# Patient Record
Sex: FEMALE | Race: WHITE | NOT HISPANIC OR LATINO | Employment: UNEMPLOYED | URBAN - METROPOLITAN AREA
[De-identification: names, ages, dates, MRNs, and addresses within clinical notes are randomized per-mention and may not be internally consistent; named-entity substitution may affect disease eponyms.]

---

## 2023-04-18 LAB
ALANINE AMINOTRANSFERASE (SGPT) (U/L) IN SER/PLAS: 18 U/L (ref 7–45)
ALBUMIN (G/DL) IN SER/PLAS: 4 G/DL (ref 3.4–5)
ALKALINE PHOSPHATASE (U/L) IN SER/PLAS: 157 U/L (ref 33–136)
ANION GAP IN SER/PLAS: 9 MMOL/L (ref 10–20)
ASPARTATE AMINOTRANSFERASE (SGOT) (U/L) IN SER/PLAS: 26 U/L (ref 9–39)
BILIRUBIN TOTAL (MG/DL) IN SER/PLAS: 0.7 MG/DL (ref 0–1.2)
CALCIUM (MG/DL) IN SER/PLAS: 9.5 MG/DL (ref 8.6–10.3)
CARBON DIOXIDE, TOTAL (MMOL/L) IN SER/PLAS: 32 MMOL/L (ref 21–32)
CHLORIDE (MMOL/L) IN SER/PLAS: 107 MMOL/L (ref 98–107)
CREATININE (MG/DL) IN SER/PLAS: 0.73 MG/DL (ref 0.5–1.05)
ERYTHROCYTE DISTRIBUTION WIDTH (RATIO) BY AUTOMATED COUNT: 20.6 % (ref 11.5–14.5)
ERYTHROCYTE MEAN CORPUSCULAR HEMOGLOBIN CONCENTRATION (G/DL) BY AUTOMATED: 29.8 G/DL (ref 32–36)
ERYTHROCYTE MEAN CORPUSCULAR VOLUME (FL) BY AUTOMATED COUNT: 91 FL (ref 80–100)
ERYTHROCYTES (10*6/UL) IN BLOOD BY AUTOMATED COUNT: 4.46 X10E12/L (ref 4–5.2)
GFR FEMALE: >90 ML/MIN/1.73M2
GLUCOSE (MG/DL) IN SER/PLAS: 82 MG/DL (ref 74–99)
HEMATOCRIT (%) IN BLOOD BY AUTOMATED COUNT: 40.6 % (ref 36–46)
HEMOGLOBIN (G/DL) IN BLOOD: 12.1 G/DL (ref 12–16)
LEUKOCYTES (10*3/UL) IN BLOOD BY AUTOMATED COUNT: 7 X10E9/L (ref 4.4–11.3)
PLATELETS (10*3/UL) IN BLOOD AUTOMATED COUNT: 182 X10E9/L (ref 150–450)
POTASSIUM (MMOL/L) IN SER/PLAS: 5 MMOL/L (ref 3.5–5.3)
PROTEIN TOTAL: 7.2 G/DL (ref 6.4–8.2)
SODIUM (MMOL/L) IN SER/PLAS: 143 MMOL/L (ref 136–145)
UREA NITROGEN (MG/DL) IN SER/PLAS: 24 MG/DL (ref 6–23)

## 2023-10-10 ENCOUNTER — OFFICE VISIT (OUTPATIENT)
Dept: WOUND CARE | Facility: CLINIC | Age: 63
End: 2023-10-10
Payer: COMMERCIAL

## 2023-10-10 PROCEDURE — 11042 DBRDMT SUBQ TIS 1ST 20SQCM/<: CPT | Performed by: CLINICAL NURSE SPECIALIST

## 2023-10-10 PROCEDURE — 11042 DBRDMT SUBQ TIS 1ST 20SQCM/<: CPT

## 2023-10-17 ENCOUNTER — OFFICE VISIT (OUTPATIENT)
Dept: WOUND CARE | Facility: CLINIC | Age: 63
End: 2023-10-17
Payer: COMMERCIAL

## 2023-10-17 PROCEDURE — 11042 DBRDMT SUBQ TIS 1ST 20SQCM/<: CPT | Performed by: CLINICAL NURSE SPECIALIST

## 2023-10-17 PROCEDURE — 11042 DBRDMT SUBQ TIS 1ST 20SQCM/<: CPT

## 2023-10-24 ENCOUNTER — OFFICE VISIT (OUTPATIENT)
Dept: WOUND CARE | Facility: CLINIC | Age: 63
End: 2023-10-24
Payer: COMMERCIAL

## 2023-10-24 PROCEDURE — 11042 DBRDMT SUBQ TIS 1ST 20SQCM/<: CPT

## 2023-10-24 PROCEDURE — 11042 DBRDMT SUBQ TIS 1ST 20SQCM/<: CPT | Performed by: CLINICAL NURSE SPECIALIST

## 2023-10-25 DIAGNOSIS — E78.5 HYPERLIPIDEMIA, UNSPECIFIED HYPERLIPIDEMIA TYPE: Primary | ICD-10-CM

## 2023-10-25 RX ORDER — ATORVASTATIN CALCIUM 40 MG/1
40 TABLET, FILM COATED ORAL DAILY
Qty: 90 TABLET | Refills: 0 | Status: SHIPPED | OUTPATIENT
Start: 2023-10-25 | End: 2023-10-26 | Stop reason: SDUPTHER

## 2023-10-26 ENCOUNTER — PATIENT MESSAGE (OUTPATIENT)
Dept: PRIMARY CARE | Facility: CLINIC | Age: 63
End: 2023-10-26
Payer: COMMERCIAL

## 2023-10-26 DIAGNOSIS — E78.5 HYPERLIPIDEMIA, UNSPECIFIED HYPERLIPIDEMIA TYPE: ICD-10-CM

## 2023-10-26 RX ORDER — ATORVASTATIN CALCIUM 40 MG/1
40 TABLET, FILM COATED ORAL DAILY
Qty: 90 TABLET | Refills: 0 | Status: SHIPPED | OUTPATIENT
Start: 2023-10-26 | End: 2024-01-04 | Stop reason: SDUPTHER

## 2023-11-07 ENCOUNTER — OFFICE VISIT (OUTPATIENT)
Dept: WOUND CARE | Facility: CLINIC | Age: 63
End: 2023-11-07
Payer: COMMERCIAL

## 2023-11-07 PROCEDURE — 11042 DBRDMT SUBQ TIS 1ST 20SQCM/<: CPT | Performed by: CLINICAL NURSE SPECIALIST

## 2023-11-07 PROCEDURE — 11042 DBRDMT SUBQ TIS 1ST 20SQCM/<: CPT

## 2023-11-14 ENCOUNTER — OFFICE VISIT (OUTPATIENT)
Dept: WOUND CARE | Facility: CLINIC | Age: 63
End: 2023-11-14
Payer: COMMERCIAL

## 2023-11-14 PROCEDURE — 11042 DBRDMT SUBQ TIS 1ST 20SQCM/<: CPT

## 2023-11-14 PROCEDURE — 11042 DBRDMT SUBQ TIS 1ST 20SQCM/<: CPT | Performed by: CLINICAL NURSE SPECIALIST

## 2023-11-21 ENCOUNTER — OFFICE VISIT (OUTPATIENT)
Dept: WOUND CARE | Facility: CLINIC | Age: 63
End: 2023-11-21
Payer: COMMERCIAL

## 2023-11-21 PROCEDURE — 11042 DBRDMT SUBQ TIS 1ST 20SQCM/<: CPT | Performed by: CLINICAL NURSE SPECIALIST

## 2023-11-21 PROCEDURE — 11042 DBRDMT SUBQ TIS 1ST 20SQCM/<: CPT

## 2023-11-28 ENCOUNTER — OFFICE VISIT (OUTPATIENT)
Dept: WOUND CARE | Facility: CLINIC | Age: 63
End: 2023-11-28
Payer: COMMERCIAL

## 2023-11-28 DIAGNOSIS — G35 MULTIPLE SCLEROSIS (MULTI): Primary | ICD-10-CM

## 2023-11-28 PROCEDURE — 11042 DBRDMT SUBQ TIS 1ST 20SQCM/<: CPT

## 2023-11-28 PROCEDURE — 11042 DBRDMT SUBQ TIS 1ST 20SQCM/<: CPT | Performed by: CLINICAL NURSE SPECIALIST

## 2023-11-28 RX ORDER — METHOTREXATE 2.5 MG/1
15 TABLET ORAL
Qty: 24 TABLET | Refills: 5 | Status: SHIPPED | OUTPATIENT
Start: 2023-11-28 | End: 2024-04-18 | Stop reason: SDUPTHER

## 2023-11-28 RX ORDER — BACLOFEN 10 MG/1
10 TABLET ORAL 4 TIMES DAILY
COMMUNITY
Start: 2012-12-19 | End: 2024-01-04 | Stop reason: SDUPTHER

## 2023-11-28 RX ORDER — ASPIRIN 81 MG/1
1 TABLET ORAL DAILY
COMMUNITY
Start: 2023-01-31

## 2023-12-05 ENCOUNTER — OFFICE VISIT (OUTPATIENT)
Dept: WOUND CARE | Facility: CLINIC | Age: 63
End: 2023-12-05
Payer: COMMERCIAL

## 2023-12-05 PROCEDURE — 11042 DBRDMT SUBQ TIS 1ST 20SQCM/<: CPT | Mod: ZK

## 2023-12-05 PROCEDURE — 11042 DBRDMT SUBQ TIS 1ST 20SQCM/<: CPT | Performed by: CLINICAL NURSE SPECIALIST

## 2023-12-12 ENCOUNTER — OFFICE VISIT (OUTPATIENT)
Dept: WOUND CARE | Facility: CLINIC | Age: 63
End: 2023-12-12
Payer: COMMERCIAL

## 2023-12-12 PROCEDURE — 11042 DBRDMT SUBQ TIS 1ST 20SQCM/<: CPT | Mod: ZK

## 2023-12-12 PROCEDURE — 11042 DBRDMT SUBQ TIS 1ST 20SQCM/<: CPT | Performed by: CLINICAL NURSE SPECIALIST

## 2023-12-19 ENCOUNTER — OFFICE VISIT (OUTPATIENT)
Dept: WOUND CARE | Facility: CLINIC | Age: 63
End: 2023-12-19
Payer: COMMERCIAL

## 2023-12-19 PROCEDURE — 11042 DBRDMT SUBQ TIS 1ST 20SQCM/<: CPT | Performed by: CLINICAL NURSE SPECIALIST

## 2023-12-19 PROCEDURE — 11042 DBRDMT SUBQ TIS 1ST 20SQCM/<: CPT

## 2023-12-26 ENCOUNTER — OFFICE VISIT (OUTPATIENT)
Dept: WOUND CARE | Facility: CLINIC | Age: 63
End: 2023-12-26
Payer: COMMERCIAL

## 2023-12-26 PROCEDURE — 11042 DBRDMT SUBQ TIS 1ST 20SQCM/<: CPT | Mod: ZK

## 2023-12-26 PROCEDURE — 11042 DBRDMT SUBQ TIS 1ST 20SQCM/<: CPT | Performed by: CLINICAL NURSE SPECIALIST

## 2024-01-02 ENCOUNTER — OFFICE VISIT (OUTPATIENT)
Dept: WOUND CARE | Facility: CLINIC | Age: 64
End: 2024-01-02
Payer: COMMERCIAL

## 2024-01-02 PROCEDURE — 11042 DBRDMT SUBQ TIS 1ST 20SQCM/<: CPT

## 2024-01-02 PROCEDURE — 11042 DBRDMT SUBQ TIS 1ST 20SQCM/<: CPT | Performed by: CLINICAL NURSE SPECIALIST

## 2024-01-04 ENCOUNTER — PATIENT MESSAGE (OUTPATIENT)
Dept: PRIMARY CARE | Facility: CLINIC | Age: 64
End: 2024-01-04
Payer: COMMERCIAL

## 2024-01-04 DIAGNOSIS — E78.5 HYPERLIPIDEMIA, UNSPECIFIED HYPERLIPIDEMIA TYPE: ICD-10-CM

## 2024-01-04 DIAGNOSIS — N31.9 NEUROGENIC BLADDER: Primary | ICD-10-CM

## 2024-01-04 DIAGNOSIS — G35 MULTIPLE SCLEROSIS (MULTI): Primary | ICD-10-CM

## 2024-01-04 PROBLEM — S31.000A SACRAL WOUND: Status: ACTIVE | Noted: 2020-10-20

## 2024-01-04 PROBLEM — R33.9 INCOMPLETE BLADDER EMPTYING: Status: ACTIVE | Noted: 2024-01-04

## 2024-01-04 PROBLEM — E55.9 VITAMIN D DEFICIENCY: Status: ACTIVE | Noted: 2019-03-29

## 2024-01-04 PROBLEM — D64.9 NORMOCYTIC ANEMIA: Status: ACTIVE | Noted: 2020-10-20

## 2024-01-04 PROBLEM — L89.152 PRESSURE INJURY OF SACRAL REGION, STAGE 2 (MULTI): Status: ACTIVE | Noted: 2020-06-08

## 2024-01-04 PROBLEM — H61.20 IMPACTED CERUMEN: Status: ACTIVE | Noted: 2019-03-29

## 2024-01-04 PROBLEM — N20.0 KIDNEY STONES: Status: ACTIVE | Noted: 2023-06-22

## 2024-01-04 PROBLEM — E43 SEVERE PROTEIN-CALORIE MALNUTRITION (MULTI): Status: ACTIVE | Noted: 2020-10-27

## 2024-01-04 PROBLEM — R35.0 INCREASED FREQUENCY OF URINATION: Status: ACTIVE | Noted: 2019-03-29

## 2024-01-04 PROBLEM — L89.310 PRESSURE INJURY OF RIGHT BUTTOCK, UNSTAGEABLE (MULTI): Status: ACTIVE | Noted: 2023-02-01

## 2024-01-04 PROBLEM — N39.0 UTI (URINARY TRACT INFECTION): Status: ACTIVE | Noted: 2024-01-04

## 2024-01-04 PROBLEM — E03.9 ACQUIRED HYPOTHYROIDISM: Status: ACTIVE | Noted: 2019-03-29

## 2024-01-04 PROBLEM — S31.819A WOUND OF RIGHT BUTTOCK: Status: ACTIVE | Noted: 2024-01-04

## 2024-01-04 PROBLEM — B96.5 PSEUDOMONAS (AERUGINOSA) (MALLEI) (PSEUDOMALLEI) AS THE CAUSE OF DISEASES CLASSIFIED ELSEWHERE: Status: ACTIVE | Noted: 2023-02-22

## 2024-01-04 PROBLEM — M25.572 ACUTE LEFT ANKLE PAIN: Status: ACTIVE | Noted: 2024-01-04

## 2024-01-04 PROBLEM — N21.0 BLADDER STONES: Status: ACTIVE | Noted: 2024-01-04

## 2024-01-04 PROBLEM — S31.809A OPEN WOUND OF BUTTOCK: Status: ACTIVE | Noted: 2022-12-28

## 2024-01-04 PROBLEM — Z20.822 CONTACT WITH AND (SUSPECTED) EXPOSURE TO COVID-19: Status: ACTIVE | Noted: 2022-11-12

## 2024-01-04 PROBLEM — Z88.0 ALLERGY STATUS TO PENICILLIN: Status: ACTIVE | Noted: 2022-11-12

## 2024-01-04 PROBLEM — D12.6 TUBULAR ADENOMA OF COLON: Status: ACTIVE | Noted: 2024-01-04

## 2024-01-04 PROBLEM — G45.9 TIA (TRANSIENT ISCHEMIC ATTACK): Status: ACTIVE | Noted: 2024-01-04

## 2024-01-04 PROBLEM — R33.9 RETENTION OF URINE, UNSPECIFIED: Status: ACTIVE | Noted: 2018-07-24

## 2024-01-04 PROBLEM — G45.9 TRANSIENT ISCHEMIC ATTACK: Status: ACTIVE | Noted: 2022-11-11

## 2024-01-04 PROBLEM — R33.8 ACUTE RETENTION OF URINE: Status: ACTIVE | Noted: 2022-11-12

## 2024-01-04 RX ORDER — BACLOFEN 10 MG/1
10 TABLET ORAL 4 TIMES DAILY
Qty: 360 TABLET | Refills: 0 | Status: SHIPPED | OUTPATIENT
Start: 2024-01-04 | End: 2024-03-18 | Stop reason: ALTCHOICE

## 2024-01-04 RX ORDER — ATORVASTATIN CALCIUM 40 MG/1
40 TABLET, FILM COATED ORAL DAILY
Qty: 90 TABLET | Refills: 0 | Status: SHIPPED | OUTPATIENT
Start: 2024-01-04 | End: 2024-03-18 | Stop reason: SDUPTHER

## 2024-01-04 RX ORDER — BACLOFEN 10 MG/1
10 TABLET ORAL 4 TIMES DAILY
Qty: 360 TABLET | Refills: 3 | Status: SHIPPED | OUTPATIENT
Start: 2024-01-04 | End: 2024-03-18 | Stop reason: ALTCHOICE

## 2024-01-04 NOTE — TELEPHONE ENCOUNTER
From: Kristi Pat  To: Rhonda Qureshi MD  Sent: 1/4/2024 10:28 AM EST  Subject: Baclofen and Atorvastatin    Dear Dr. Minor,    I need a renewal on both medicaitons - I have an appointment scheduled with you on 3/18/24 at 10:00 AM for my annual physical.    Baclofen: I will run out in early February there are no reills available.  Atorvastatin: I will run out in late February again with no refills available at this time.    If they could be renewed via [facility] that would be great (on the Baclofen they will reach out to you since I tried to refill today). I have not attempted to refill the Atorvastatin yet - not till Feb.    Thank you, Kristi

## 2024-01-09 ENCOUNTER — OFFICE VISIT (OUTPATIENT)
Dept: WOUND CARE | Facility: CLINIC | Age: 64
End: 2024-01-09
Payer: COMMERCIAL

## 2024-01-09 PROCEDURE — 11042 DBRDMT SUBQ TIS 1ST 20SQCM/<: CPT | Performed by: CLINICAL NURSE SPECIALIST

## 2024-01-09 PROCEDURE — 11042 DBRDMT SUBQ TIS 1ST 20SQCM/<: CPT | Mod: ZK

## 2024-01-16 ENCOUNTER — OFFICE VISIT (OUTPATIENT)
Dept: WOUND CARE | Facility: CLINIC | Age: 64
End: 2024-01-16
Payer: COMMERCIAL

## 2024-01-16 PROCEDURE — 11042 DBRDMT SUBQ TIS 1ST 20SQCM/<: CPT

## 2024-01-16 PROCEDURE — 11042 DBRDMT SUBQ TIS 1ST 20SQCM/<: CPT | Performed by: CLINICAL NURSE SPECIALIST

## 2024-01-23 ENCOUNTER — OFFICE VISIT (OUTPATIENT)
Dept: WOUND CARE | Facility: CLINIC | Age: 64
End: 2024-01-23
Payer: COMMERCIAL

## 2024-01-23 PROCEDURE — 11042 DBRDMT SUBQ TIS 1ST 20SQCM/<: CPT | Performed by: CLINICAL NURSE SPECIALIST

## 2024-01-23 PROCEDURE — 11042 DBRDMT SUBQ TIS 1ST 20SQCM/<: CPT | Mod: ZK

## 2024-01-30 ENCOUNTER — APPOINTMENT (OUTPATIENT)
Dept: WOUND CARE | Facility: CLINIC | Age: 64
End: 2024-01-30
Payer: COMMERCIAL

## 2024-02-06 ENCOUNTER — OFFICE VISIT (OUTPATIENT)
Dept: WOUND CARE | Facility: CLINIC | Age: 64
End: 2024-02-06
Payer: COMMERCIAL

## 2024-02-06 PROCEDURE — 11042 DBRDMT SUBQ TIS 1ST 20SQCM/<: CPT | Performed by: CLINICAL NURSE SPECIALIST

## 2024-02-06 PROCEDURE — 11042 DBRDMT SUBQ TIS 1ST 20SQCM/<: CPT | Mod: ZK

## 2024-02-20 ENCOUNTER — OFFICE VISIT (OUTPATIENT)
Dept: WOUND CARE | Facility: CLINIC | Age: 64
End: 2024-02-20
Payer: COMMERCIAL

## 2024-02-20 PROCEDURE — 11042 DBRDMT SUBQ TIS 1ST 20SQCM/<: CPT | Performed by: CLINICAL NURSE SPECIALIST

## 2024-02-20 PROCEDURE — 11042 DBRDMT SUBQ TIS 1ST 20SQCM/<: CPT | Mod: ZK

## 2024-02-27 ENCOUNTER — OFFICE VISIT (OUTPATIENT)
Dept: WOUND CARE | Facility: CLINIC | Age: 64
End: 2024-02-27
Payer: COMMERCIAL

## 2024-02-27 PROCEDURE — 11042 DBRDMT SUBQ TIS 1ST 20SQCM/<: CPT | Mod: ZK

## 2024-02-27 PROCEDURE — 11042 DBRDMT SUBQ TIS 1ST 20SQCM/<: CPT | Performed by: CLINICAL NURSE SPECIALIST

## 2024-03-05 ENCOUNTER — APPOINTMENT (OUTPATIENT)
Dept: WOUND CARE | Facility: CLINIC | Age: 64
End: 2024-03-05
Payer: COMMERCIAL

## 2024-03-12 ENCOUNTER — OFFICE VISIT (OUTPATIENT)
Dept: WOUND CARE | Facility: CLINIC | Age: 64
End: 2024-03-12
Payer: COMMERCIAL

## 2024-03-12 PROCEDURE — 99213 OFFICE O/P EST LOW 20 MIN: CPT

## 2024-03-12 PROCEDURE — 99213 OFFICE O/P EST LOW 20 MIN: CPT | Performed by: CLINICAL NURSE SPECIALIST

## 2024-03-14 ASSESSMENT — PROMIS GLOBAL HEALTH SCALE
RATE_AVERAGE_FATIGUE: MILD
CARRYOUT_PHYSICAL_ACTIVITIES: NOT AT ALL
RATE_GENERAL_HEALTH: VERY GOOD
CARRYOUT_SOCIAL_ACTIVITIES: GOOD
RATE_MENTAL_HEALTH: VERY GOOD
EMOTIONAL_PROBLEMS: RARELY
RATE_QUALITY_OF_LIFE: GOOD
RATE_PHYSICAL_HEALTH: VERY GOOD
RATE_AVERAGE_PAIN: 1
RATE_SOCIAL_SATISFACTION: VERY GOOD

## 2024-03-18 ENCOUNTER — OFFICE VISIT (OUTPATIENT)
Dept: PRIMARY CARE | Facility: CLINIC | Age: 64
End: 2024-03-18
Payer: COMMERCIAL

## 2024-03-18 VITALS
TEMPERATURE: 96.9 F | DIASTOLIC BLOOD PRESSURE: 54 MMHG | SYSTOLIC BLOOD PRESSURE: 102 MMHG | OXYGEN SATURATION: 98 % | HEART RATE: 94 BPM

## 2024-03-18 DIAGNOSIS — H61.23 BILATERAL IMPACTED CERUMEN: ICD-10-CM

## 2024-03-18 DIAGNOSIS — N31.9 NEUROGENIC BLADDER: ICD-10-CM

## 2024-03-18 DIAGNOSIS — Z00.00 ENCOUNTER FOR HEALTH MAINTENANCE EXAMINATION IN ADULT: ICD-10-CM

## 2024-03-18 DIAGNOSIS — G35 MULTIPLE SCLEROSIS (MULTI): ICD-10-CM

## 2024-03-18 DIAGNOSIS — Z00.00 PHYSICAL EXAM: Primary | ICD-10-CM

## 2024-03-18 DIAGNOSIS — E78.5 HYPERLIPIDEMIA, UNSPECIFIED HYPERLIPIDEMIA TYPE: ICD-10-CM

## 2024-03-18 DIAGNOSIS — Z12.31 ENCOUNTER FOR SCREENING MAMMOGRAM FOR MALIGNANT NEOPLASM OF BREAST: ICD-10-CM

## 2024-03-18 PROBLEM — Z99.3 DEPENDENCE ON WHEELCHAIR: Status: ACTIVE | Noted: 2023-09-05

## 2024-03-18 PROCEDURE — 99396 PREV VISIT EST AGE 40-64: CPT | Performed by: INTERNAL MEDICINE

## 2024-03-18 PROCEDURE — 69209 REMOVE IMPACTED EAR WAX UNI: CPT | Performed by: INTERNAL MEDICINE

## 2024-03-18 RX ORDER — BACLOFEN 10 MG/1
10 TABLET ORAL 4 TIMES DAILY
Qty: 360 TABLET | Refills: 3 | Status: SHIPPED | OUTPATIENT
Start: 2024-03-18 | End: 2025-03-18

## 2024-03-18 RX ORDER — ATORVASTATIN CALCIUM 40 MG/1
40 TABLET, FILM COATED ORAL DAILY
Qty: 90 TABLET | Refills: 3 | Status: SHIPPED | OUTPATIENT
Start: 2024-03-18 | End: 2025-03-13

## 2024-03-18 ASSESSMENT — PATIENT HEALTH QUESTIONNAIRE - PHQ9
2. FEELING DOWN, DEPRESSED OR HOPELESS: NOT AT ALL
SUM OF ALL RESPONSES TO PHQ QUESTIONS 1-9: 3
10. IF YOU CHECKED OFF ANY PROBLEMS, HOW DIFFICULT HAVE THESE PROBLEMS MADE IT FOR YOU TO DO YOUR WORK, TAKE CARE OF THINGS AT HOME, OR GET ALONG WITH OTHER PEOPLE: SOMEWHAT DIFFICULT
9. THOUGHTS THAT YOU WOULD BE BETTER OFF DEAD, OR OF HURTING YOURSELF: NOT AT ALL
1. LITTLE INTEREST OR PLEASURE IN DOING THINGS: NOT AT ALL
5. POOR APPETITE OR OVEREATING: NOT AT ALL
4. FEELING TIRED OR HAVING LITTLE ENERGY: SEVERAL DAYS
6. FEELING BAD ABOUT YOURSELF - OR THAT YOU ARE A FAILURE OR HAVE LET YOURSELF OR YOUR FAMILY DOWN: NOT AT ALL
SUM OF ALL RESPONSES TO PHQ9 QUESTIONS 1 AND 2: 0
7. TROUBLE CONCENTRATING ON THINGS, SUCH AS READING THE NEWSPAPER OR WATCHING TELEVISION: NOT AT ALL
3. TROUBLE FALLING OR STAYING ASLEEP OR SLEEPING TOO MUCH: MORE THAN HALF THE DAYS
8. MOVING OR SPEAKING SO SLOWLY THAT OTHER PEOPLE COULD HAVE NOTICED. OR THE OPPOSITE, BEING SO FIGETY OR RESTLESS THAT YOU HAVE BEEN MOVING AROUND A LOT MORE THAN USUAL: NOT AT ALL

## 2024-03-18 ASSESSMENT — ANXIETY QUESTIONNAIRES
6. BECOMING EASILY ANNOYED OR IRRITABLE: NOT AT ALL
1. FEELING NERVOUS, ANXIOUS, OR ON EDGE: NOT AT ALL
GAD7 TOTAL SCORE: 3
3. WORRYING TOO MUCH ABOUT DIFFERENT THINGS: SEVERAL DAYS
2. NOT BEING ABLE TO STOP OR CONTROL WORRYING: SEVERAL DAYS
IF YOU CHECKED OFF ANY PROBLEMS ON THIS QUESTIONNAIRE, HOW DIFFICULT HAVE THESE PROBLEMS MADE IT FOR YOU TO DO YOUR WORK, TAKE CARE OF THINGS AT HOME, OR GET ALONG WITH OTHER PEOPLE: SOMEWHAT DIFFICULT
4. TROUBLE RELAXING: SEVERAL DAYS
7. FEELING AFRAID AS IF SOMETHING AWFUL MIGHT HAPPEN: NOT AT ALL
5. BEING SO RESTLESS THAT IT IS HARD TO SIT STILL: NOT AT ALL

## 2024-03-18 NOTE — PROGRESS NOTES
"SUBJECTIVE:   Kristi Pat is a 63 y.o. female presenting for her annual physical/wellness.  PCP: Rhonda Qureshi MD  Physical.  Doing well, things are stable  Feels hearing muffled, and no pain.  MS is stable, follows with neurologist   Wound on right gluteus is healing well, one more visit with wound care.    Colon screening: Colonoscopy 2017, very small polyp found  Last pap: na  Last mammogram: 5/2023  Dexa na  Vaccines Up to date, will get RSV also.  Diet:   healthy in general     No results found for: \"HGBA1C\"  Lab Results   Component Value Date    CREATININE 0.73 04/18/2023     Lab Results   Component Value Date    WBC 7.0 04/18/2023    HGB 12.1 04/18/2023    HCT 40.6 04/18/2023    MCV 91 04/18/2023     04/18/2023     Lab Results   Component Value Date    CHOL 215 (H) 04/23/2018     Lab Results   Component Value Date    HDL 54.8 04/23/2018     No results found for: \"LDLCALC\"  No results found for: \"TRIG\"  No components found for: \"CHOLHDL\"       ROS:   Feeling well. No dyspnea or chest pain on exertion. No abdominal pain, change in bowel habits, black or bloody stools. No urinary tract or  symptoms.  No breast concerns. No neurological complaints.    OBJECTIVE:   The patient appears well, alert, oriented x 3, in no distress.   /54   Pulse 94   Temp 36.1 °C (96.9 °F)   SpO2 98%   ENT normal except for cerumen in both ears. Neck supple. No adenopathy or thyromegaly. GENARO. Lungs are clear, good air entry, no wheezes, rhonchi or rales. S1 and S2 normal, no murmurs, regular rate and rhythm.        1. Physical exam        2. Hyperlipidemia, unspecified hyperlipidemia type  atorvastatin (Lipitor) 40 mg tablet      3. Multiple sclerosis (CMS/HCC)        4. Neurogenic bladder  baclofen (Lioresal) 10 mg tablet      5. Encounter for screening mammogram for malignant neoplasm of breast  BI mammo bilateral screening tomosynthesis      6. Encounter for health maintenance examination in adult  CBC, " Hemoglobin A1C, Lipid Panel, Comprehensive Metabolic Panel        Patient ID: Kristi Pat is a 63 y.o. female.    Ear Cerumen Removal    Date/Time: 3/18/2024 11:29 AM    Performed by: Rhonda Qureshi MD  Authorized by: Rhonda Qureshi MD    Consent:     Consent obtained:  Verbal    Consent given by:  Patient    Risks, benefits, and alternatives were discussed: yes      Risks discussed:  Pain    Alternatives discussed:  No treatment  Universal protocol:     Procedure explained and questions answered to patient or proxy's satisfaction: yes      Patient identity confirmed:  Verbally with patient  Procedure details:     Location:  L ear and R ear    Procedure type: irrigation      Procedure outcomes: cerumen removed    Post-procedure details:     Inspection:  Ear canal clear, no bleeding and TM intact    Hearing quality:  Improved    Procedure completion:  Tolerated

## 2024-03-26 ENCOUNTER — LAB (OUTPATIENT)
Dept: LAB | Facility: LAB | Age: 64
End: 2024-03-26
Payer: COMMERCIAL

## 2024-03-26 ENCOUNTER — OFFICE VISIT (OUTPATIENT)
Dept: WOUND CARE | Facility: CLINIC | Age: 64
End: 2024-03-26
Payer: COMMERCIAL

## 2024-03-26 DIAGNOSIS — Z00.00 ENCOUNTER FOR HEALTH MAINTENANCE EXAMINATION IN ADULT: ICD-10-CM

## 2024-03-26 LAB
ALBUMIN SERPL BCP-MCNC: 4.1 G/DL (ref 3.4–5)
ALP SERPL-CCNC: 92 U/L (ref 33–136)
ALT SERPL W P-5'-P-CCNC: 43 U/L (ref 7–45)
ANION GAP SERPL CALC-SCNC: 10 MMOL/L (ref 10–20)
AST SERPL W P-5'-P-CCNC: 40 U/L (ref 9–39)
BILIRUB SERPL-MCNC: 0.9 MG/DL (ref 0–1.2)
BUN SERPL-MCNC: 22 MG/DL (ref 6–23)
CALCIUM SERPL-MCNC: 9.6 MG/DL (ref 8.6–10.3)
CHLORIDE SERPL-SCNC: 107 MMOL/L (ref 98–107)
CHOLEST SERPL-MCNC: 111 MG/DL (ref 0–199)
CHOLESTEROL/HDL RATIO: 2.5
CO2 SERPL-SCNC: 32 MMOL/L (ref 21–32)
CREAT SERPL-MCNC: 0.82 MG/DL (ref 0.5–1.05)
EGFRCR SERPLBLD CKD-EPI 2021: 81 ML/MIN/1.73M*2
ERYTHROCYTE [DISTWIDTH] IN BLOOD BY AUTOMATED COUNT: 14 % (ref 11.5–14.5)
EST. AVERAGE GLUCOSE BLD GHB EST-MCNC: 111 MG/DL
GLUCOSE SERPL-MCNC: 87 MG/DL (ref 74–99)
HBA1C MFR BLD: 5.5 %
HCT VFR BLD AUTO: 44.6 % (ref 36–46)
HDLC SERPL-MCNC: 43.8 MG/DL
HGB BLD-MCNC: 13.9 G/DL (ref 12–16)
LDLC SERPL CALC-MCNC: 51 MG/DL
MCH RBC QN AUTO: 30.7 PG (ref 26–34)
MCHC RBC AUTO-ENTMCNC: 31.2 G/DL (ref 32–36)
MCV RBC AUTO: 99 FL (ref 80–100)
NON HDL CHOLESTEROL: 67 MG/DL (ref 0–149)
NRBC BLD-RTO: 0 /100 WBCS (ref 0–0)
PLATELET # BLD AUTO: 143 X10*3/UL (ref 150–450)
POTASSIUM SERPL-SCNC: 5.6 MMOL/L (ref 3.5–5.3)
PROT SERPL-MCNC: 7 G/DL (ref 6.4–8.2)
RBC # BLD AUTO: 4.53 X10*6/UL (ref 4–5.2)
SODIUM SERPL-SCNC: 143 MMOL/L (ref 136–145)
TRIGL SERPL-MCNC: 83 MG/DL (ref 0–149)
VLDL: 17 MG/DL (ref 0–40)
WBC # BLD AUTO: 5.7 X10*3/UL (ref 4.4–11.3)

## 2024-03-26 PROCEDURE — 83036 HEMOGLOBIN GLYCOSYLATED A1C: CPT

## 2024-03-26 PROCEDURE — 99212 OFFICE O/P EST SF 10 MIN: CPT

## 2024-03-26 PROCEDURE — 36415 COLL VENOUS BLD VENIPUNCTURE: CPT

## 2024-03-26 PROCEDURE — 80061 LIPID PANEL: CPT

## 2024-03-26 PROCEDURE — 80053 COMPREHEN METABOLIC PANEL: CPT

## 2024-03-26 PROCEDURE — 99212 OFFICE O/P EST SF 10 MIN: CPT | Performed by: CLINICAL NURSE SPECIALIST

## 2024-03-26 PROCEDURE — 85027 COMPLETE CBC AUTOMATED: CPT

## 2024-03-28 DIAGNOSIS — N20.0 KIDNEY STONES: ICD-10-CM

## 2024-03-29 DIAGNOSIS — E87.5 HYPERKALEMIA: Primary | ICD-10-CM

## 2024-03-29 DIAGNOSIS — D69.6 THROMBOCYTOPENIA (CMS-HCC): ICD-10-CM

## 2024-04-09 ENCOUNTER — APPOINTMENT (OUTPATIENT)
Dept: WOUND CARE | Facility: CLINIC | Age: 64
End: 2024-04-09
Payer: COMMERCIAL

## 2024-04-18 ENCOUNTER — OFFICE VISIT (OUTPATIENT)
Dept: NEUROLOGY | Facility: CLINIC | Age: 64
End: 2024-04-18
Payer: COMMERCIAL

## 2024-04-18 VITALS
BODY MASS INDEX: 19.01 KG/M2 | RESPIRATION RATE: 18 BRPM | DIASTOLIC BLOOD PRESSURE: 72 MMHG | SYSTOLIC BLOOD PRESSURE: 102 MMHG | WEIGHT: 125 LBS | HEART RATE: 132 BPM

## 2024-04-18 DIAGNOSIS — G35 MULTIPLE SCLEROSIS (MULTI): Primary | ICD-10-CM

## 2024-04-18 PROCEDURE — 99214 OFFICE O/P EST MOD 30 MIN: CPT | Performed by: PSYCHIATRY & NEUROLOGY

## 2024-04-18 RX ORDER — METHOTREXATE 2.5 MG/1
15 TABLET ORAL
Qty: 72 TABLET | Refills: 1 | Status: SHIPPED | OUTPATIENT
Start: 2024-04-21

## 2024-04-18 ASSESSMENT — PAIN SCALES - GENERAL: PAINLEVEL: 0-NO PAIN

## 2024-04-18 NOTE — PROGRESS NOTES
"Chief Complaint  f/u visit for MS      History Summary, last seen 3/23  Ms. Guerrero is a 62yo F with hx of MS (dx 1985, used to be on Copaxone, then on MTX). She last seen Dr. Petty back in 2014 before he retired then switched care to Dr. Jaime at [facility] for continuity of care.  She presented to our MS clinic to re-establish care at .  Pt clinically stable for the past year but getting progressively weak in her BLEs throughout the years along with bladder incontinence. She used to mobilize using a rollator-walker. However, after she fell and had an ulcer and wound at the buttock area she switched to an electronic scooter in an attempt to avoid falling and worsening the wound.  In Nov '22 had an episode of fragmented speech, could not make sense for 25 minutes, no other symptoms, could understand fine, diagnosed with TIA at , started statin and ASA.     MRI brain 05/23 vs  11/22 and 03/21  1. Redemonstrated is a moderate degree of patchy and confluent signal   within the bilateral cerebral hemispheres and antonio compatible with   chronic small vessel ischemic change and or demyelinating disease.   The bilateral brachium pontis and right cerebellar signal is less   conspicuous on the current exam. No definite new abnormal signal. No   diffusion restricting or enhancing lesions on the current exam.   2. No acute intracranial infarct or mass effect.   3. Parenchymal volume loss is similar to the prior exam.   MRI brain 11/21 vs 3/21: apparent new changes in both middle cerebellar peduncles on FLAIR, less obvious on T2     Interval Hx:  Wound on the hip has \"finally healed\". No infections. Was in bed and had a cold, chills yesterday but feels better today. They have a sit to stand lift, she is able to push up with her legs. At night in bed feels like she has socks on but she does not, no other associated symptoms. She feels stable but admits her legs might be weaker.    Patient Active Problem List   Diagnosis    " Wound of right buttock    Open wound of buttock    Vitamin D deficiency    UTI (urinary tract infection)    Tubular adenoma of colon    Transient ischemic attack    TIA (transient ischemic attack)    Severe protein-calorie malnutrition (Multi)    Sacral wound    Pressure injury of sacral region, stage 2 (Multi)    Normocytic anemia    Neurogenic bladder    Multiple sclerosis (Multi)    Kidney stones    Increased frequency of urination    Incomplete bladder emptying    Impacted cerumen    Hyperlipidemia    Hyperlipemia    Contact with and (suspected) exposure to covid-19    Bladder stones    Retention of urine, unspecified    Acute retention of urine    Acute left ankle pain    Acquired hypothyroidism    Pseudomonas (aeruginosa) (mallei) (pseudomallei) as the cause of diseases classified elsewhere    Pressure injury of right buttock, unstageable (Multi)    Allergy status to penicillin    Dependence on wheelchair        Current Outpatient Medications:     aspirin 81 mg EC tablet, Take 1 tablet (81 mg) by mouth once daily., Disp: , Rfl:     atorvastatin (Lipitor) 40 mg tablet, Take 1 tablet (40 mg) by mouth once daily., Disp: 90 tablet, Rfl: 3    baclofen (Lioresal) 10 mg tablet, Take 1 tablet (10 mg) by mouth 4 times a day., Disp: 360 tablet, Rfl: 3    multivitamin with minerals (multivitamin-iron-folic acid) tablet, Take 1 tablet by mouth once daily., Disp: , Rfl:     [START ON 4/21/2024] methotrexate (Trexall) 2.5 mg tablet, Take 6 tablets (15 mg total) by mouth 1 (one) time per week.  Follow directions carefully, and ask to explain any part you do not understand. Take exactly as directed., Disp: 72 tablet, Rfl: 1     Physical Exam  GENERAL APPEARANCE: No distress, alert, interactive and cooperative.   MENTAL STATE:   Orientation was normal to time, place and person. Recent and remote memory was intact. Attention span and concentration were normal. Language nl.  CRANIAL NERVES:   CN 3, 4, 6   EOMs normal alignment,  full range with normal saccades, pursuit and convergence.   CN 5   Facial sensation intact bilaterally.   CN 7   Normal and symmetric facial strength.     MOTOR:   Muscle bulk decreased in both UEs and LEs. Tone were normal in both upper and lower extremities. Muscle strength was 5/5 in distal and proximal muscles in both UEs, but R hip flexion 3/5, R knee extension 5-/5, R knee flexion 4-/5, R dorsiflexion and planter flexion 4-/5, L hip flexion 2/5, L knee extension 4-/5, L knee flexion 3/5, L dorsiflexion and planter flexion 3-4-/5.       SENSORY:   Sensory exam was normal.     PEG R 49.97 L 52.79  GAIT: not possible, patient in WC     Provider Impressions  ASSESSMENT:  Ms. Guerrero is a 62yo F with hx of PPMS (dx 1985, used to be on Copaxone, now on MTX). She had presented to our clinic to re-establish care with  due to insurance issues. Episode of aphasia, TIA, in Nov 2022. MRI done at that time showed possible new lesions in both middle cerebellar peduncles, which was puzzling as she had no new symptoms as expected usually with new infratentorial lesions. Brain MRI stable since. I also do not expect her disease to be active at this stage.   Exam is worsening probably also due to deconditioning, her legs are weaker.   She is aware of the risks of infection in her age range while on methotrexate, I explained it would be reasonable to stop it but she feels she is still benefiting from it and wishes to continue.      Kristi was seen today for multiple sclerosis.  Diagnoses and all orders for this visit:  Multiple sclerosis (Multi) (Primary)  -     Referral to Physical Therapy; Future  -     Follow Up In Neurology; Future  -     methotrexate (Trexall) 2.5 mg tablet; Take 6 tablets (15 mg total) by mouth 1 (one) time per week.  Follow directions carefully, and ask to explain any part you do not understand. Take exactly as directed.

## 2024-04-18 NOTE — PATIENT INSTRUCTIONS
Kristi, it was great to see you today.  We can work on your medication being prescribed for 3 months instead.  I think your legs are getting weaker and I strongly recommend you see physical therapy, referral provided.  We can see you back in 1 year.

## 2024-04-23 ENCOUNTER — HOSPITAL ENCOUNTER (OUTPATIENT)
Dept: RADIOLOGY | Facility: HOSPITAL | Age: 64
Discharge: HOME | End: 2024-04-23
Payer: COMMERCIAL

## 2024-04-23 DIAGNOSIS — N20.0 KIDNEY STONES: ICD-10-CM

## 2024-04-23 PROCEDURE — 76770 US EXAM ABDO BACK WALL COMP: CPT

## 2024-04-23 PROCEDURE — 76770 US EXAM ABDO BACK WALL COMP: CPT | Performed by: RADIOLOGY

## 2024-06-04 ENCOUNTER — HOSPITAL ENCOUNTER (OUTPATIENT)
Dept: RADIOLOGY | Facility: CLINIC | Age: 64
Discharge: HOME | End: 2024-06-04
Payer: COMMERCIAL

## 2024-06-04 ENCOUNTER — LAB (OUTPATIENT)
Dept: LAB | Facility: LAB | Age: 64
End: 2024-06-04
Payer: COMMERCIAL

## 2024-06-04 VITALS — BODY MASS INDEX: 18.94 KG/M2 | WEIGHT: 125 LBS | HEIGHT: 68 IN

## 2024-06-04 DIAGNOSIS — E87.5 HYPERKALEMIA: ICD-10-CM

## 2024-06-04 DIAGNOSIS — Z12.31 ENCOUNTER FOR SCREENING MAMMOGRAM FOR MALIGNANT NEOPLASM OF BREAST: ICD-10-CM

## 2024-06-04 DIAGNOSIS — D69.6 THROMBOCYTOPENIA (CMS-HCC): ICD-10-CM

## 2024-06-04 LAB
ANION GAP SERPL CALC-SCNC: 12 MMOL/L (ref 10–20)
BUN SERPL-MCNC: 25 MG/DL (ref 6–23)
CALCIUM SERPL-MCNC: 9.5 MG/DL (ref 8.6–10.3)
CHLORIDE SERPL-SCNC: 105 MMOL/L (ref 98–107)
CO2 SERPL-SCNC: 29 MMOL/L (ref 21–32)
CREAT SERPL-MCNC: 0.76 MG/DL (ref 0.5–1.05)
EGFRCR SERPLBLD CKD-EPI 2021: 89 ML/MIN/1.73M*2
ERYTHROCYTE [DISTWIDTH] IN BLOOD BY AUTOMATED COUNT: 16.3 % (ref 11.5–14.5)
GLUCOSE SERPL-MCNC: 102 MG/DL (ref 74–99)
HCT VFR BLD AUTO: 41.4 % (ref 36–46)
HGB BLD-MCNC: 12.9 G/DL (ref 12–16)
MCH RBC QN AUTO: 28.8 PG (ref 26–34)
MCHC RBC AUTO-ENTMCNC: 31.2 G/DL (ref 32–36)
MCV RBC AUTO: 92 FL (ref 80–100)
NRBC BLD-RTO: 0 /100 WBCS (ref 0–0)
PLATELET # BLD AUTO: 199 X10*3/UL (ref 150–450)
POTASSIUM SERPL-SCNC: 5 MMOL/L (ref 3.5–5.3)
RBC # BLD AUTO: 4.48 X10*6/UL (ref 4–5.2)
SODIUM SERPL-SCNC: 141 MMOL/L (ref 136–145)
WBC # BLD AUTO: 7.7 X10*3/UL (ref 4.4–11.3)

## 2024-06-04 PROCEDURE — 80048 BASIC METABOLIC PNL TOTAL CA: CPT

## 2024-06-04 PROCEDURE — 77067 SCR MAMMO BI INCL CAD: CPT

## 2024-06-04 PROCEDURE — 36415 COLL VENOUS BLD VENIPUNCTURE: CPT

## 2024-06-04 PROCEDURE — 77063 BREAST TOMOSYNTHESIS BI: CPT | Performed by: STUDENT IN AN ORGANIZED HEALTH CARE EDUCATION/TRAINING PROGRAM

## 2024-06-04 PROCEDURE — 85027 COMPLETE CBC AUTOMATED: CPT

## 2024-06-04 PROCEDURE — 77067 SCR MAMMO BI INCL CAD: CPT | Performed by: STUDENT IN AN ORGANIZED HEALTH CARE EDUCATION/TRAINING PROGRAM

## 2024-07-10 RX ORDER — NITROFURANTOIN 25; 75 MG/1; MG/1
CAPSULE ORAL
COMMUNITY
Start: 2024-03-21

## 2024-08-02 ENCOUNTER — OFFICE VISIT (OUTPATIENT)
Dept: WOUND CARE | Facility: CLINIC | Age: 64
End: 2024-08-02
Payer: COMMERCIAL

## 2024-08-02 PROCEDURE — 99213 OFFICE O/P EST LOW 20 MIN: CPT

## 2024-08-02 PROCEDURE — 11042 DBRDMT SUBQ TIS 1ST 20SQCM/<: CPT

## 2024-08-08 ENCOUNTER — OFFICE VISIT (OUTPATIENT)
Dept: WOUND CARE | Facility: CLINIC | Age: 64
End: 2024-08-08
Payer: COMMERCIAL

## 2024-08-08 PROCEDURE — 11042 DBRDMT SUBQ TIS 1ST 20SQCM/<: CPT

## 2024-08-16 ENCOUNTER — OFFICE VISIT (OUTPATIENT)
Dept: WOUND CARE | Facility: CLINIC | Age: 64
End: 2024-08-16
Payer: COMMERCIAL

## 2024-08-16 PROCEDURE — 11042 DBRDMT SUBQ TIS 1ST 20SQCM/<: CPT

## 2024-08-19 ENCOUNTER — APPOINTMENT (OUTPATIENT)
Dept: UROLOGY | Facility: CLINIC | Age: 64
End: 2024-08-19
Payer: COMMERCIAL

## 2024-08-23 ENCOUNTER — OFFICE VISIT (OUTPATIENT)
Dept: WOUND CARE | Facility: CLINIC | Age: 64
End: 2024-08-23
Payer: COMMERCIAL

## 2024-08-23 PROCEDURE — 11042 DBRDMT SUBQ TIS 1ST 20SQCM/<: CPT

## 2024-08-26 ENCOUNTER — APPOINTMENT (OUTPATIENT)
Dept: UROLOGY | Facility: CLINIC | Age: 64
End: 2024-08-26
Payer: COMMERCIAL

## 2024-08-26 VITALS — TEMPERATURE: 97.1 F | HEIGHT: 69 IN | WEIGHT: 125 LBS | BODY MASS INDEX: 18.51 KG/M2

## 2024-08-26 DIAGNOSIS — N30.90 RECURRENT CYSTITIS: ICD-10-CM

## 2024-08-26 DIAGNOSIS — N20.0 KIDNEY STONES: Primary | ICD-10-CM

## 2024-08-26 DIAGNOSIS — N31.9 NEUROGENIC BLADDER: ICD-10-CM

## 2024-08-26 PROCEDURE — 99214 OFFICE O/P EST MOD 30 MIN: CPT | Performed by: STUDENT IN AN ORGANIZED HEALTH CARE EDUCATION/TRAINING PROGRAM

## 2024-08-26 PROCEDURE — G2211 COMPLEX E/M VISIT ADD ON: HCPCS | Performed by: STUDENT IN AN ORGANIZED HEALTH CARE EDUCATION/TRAINING PROGRAM

## 2024-08-26 PROCEDURE — 3008F BODY MASS INDEX DOCD: CPT | Performed by: STUDENT IN AN ORGANIZED HEALTH CARE EDUCATION/TRAINING PROGRAM

## 2024-08-26 RX ORDER — NITROFURANTOIN 25; 75 MG/1; MG/1
100 CAPSULE ORAL DAILY
Qty: 90 CAPSULE | Refills: 3 | Status: SHIPPED | OUTPATIENT
Start: 2024-08-26

## 2024-08-26 ASSESSMENT — PAIN SCALES - GENERAL: PAINLEVEL: 0-NO PAIN

## 2024-08-26 NOTE — PROGRESS NOTES
Scribed for Dr. Prince Don by Lucas Carias. I, Dr. Prince Don have personally reviewed and agreed with the information entered by the Virtual Scribe. 08/26/24.    ASSESSMENT:  Problem List Items Addressed This Visit       Neurogenic bladder    Relevant Medications    nitrofurantoin, macrocrystal-monohydrate, (Macrobid) 100 mg capsule    Other Relevant Orders    US renal complete    Kidney stones - Primary    Relevant Medications    nitrofurantoin, macrocrystal-monohydrate, (Macrobid) 100 mg capsule    Other Relevant Orders    US renal complete     Other Visit Diagnoses       Recurrent cystitis        Relevant Medications    nitrofurantoin, macrocrystal-monohydrate, (Macrobid) 100 mg capsule    Other Relevant Orders    US renal complete          Multiple sclerosis  Neurogenic bladder  Urinary incontinence - nighttime  Recurrent UTIs - stable on ppx antibiotics  Nephrolithiasis   Bladder stone    PLAN:  #Nephrolithiasis  RTC in 1 year with a RBUS prior to for stone surveillance.   Can consider ESWL vs URS if worsening stone burden or if she becomes symptomatic.   Stone burden appears stable compared to prior.   Renal function appears stable as well.   Patient reassured.     Renal ultrasound (04/23/24) personally reviewed.   Noted a 6 mm right renal stone, non-obstructing.   No hydronephrosis bilaterally.     #Recurrent UTI's  Continue Macrodantin for prophylaxis.   Denies any UTI's over the past year.   Rx refill provided.     All questions were answered to the patient’s satisfaction.  Patient agrees with the plan and wishes to proceed.  Continue follow-up for ongoing care of her chronic medical conditions.       History of Present Illness (HPI):  Kristi presents for an annual follow up visit.  The patient’s EMR has been reviewed.  Lives in [place], [place].     TODAY: (08/26/24)  Reports she has been doing well overall.   No recent infections, gross hematuria, flank pain, fevers or chills.   Denies any stone-related  symptoms.   Doing well on daily Macrobid for prophylaxis.   Denies side-effects or any UTI's over the past year.     Renal ultrasound (04/23/24) personally reviewed.   Noted a 6 mm right renal stone, non-obstructing.   No hydronephrosis bilaterally.   Stone burden appears stable compared to prior.   Renal function appears stable as well.   Patient reassured.      TO REVIEW: Last visit (08/21/23)  Jose her  provides additional history  Reports she is doing well overall.  Denies any stone -related symptoms.  Denies any recent UTIs, dysuria, gross hematuria, flank pain, fevers or chills.    CT A&P (06/22/23) personally reviewed.   Showed a stable 4 mm stone on the right, non-obstructing.  Findings reviewed with patient today.      TO REVIEW:  Has MS, follows for urinary tract infections and urinary frequency.   She was initially seen in July 2018 at which time she was noted to have an elevated PVR   (straight catheterized for 450 mL).   As she had been taking oxybutynin we had her stop  Felt no change once this was stopped  Is still taking prophylactic nitrofurantoin daily  No new or changed neurologic symptoms lately  No UTIs in the last year  wakes up once at night to void  goes in the depends so she doesn't have to wake her  to go     RBUS in 06/2021 was reassuring  5mm interpolar nonobstructing stones seen bilaterally but no hydronephrosis  RBUS from 05/2022 now shows the right stone is 9mm and the left has two 4mm stones     Had not had a UTI in probably 4 years since starting macrobid  Prior to that she was getting them all the time.   Feels like they started after menopause.      States she gets up every 2-3 hours during the day and with timed voiding her symptoms are well controlled   she sleeps through the night most nights  Is sexually active and does not have issues with vaginal dryness.   Follows with ob/gyn yearly- no known prolapse     MS since 1983     Urine: negative     PVR much better  today of 75ml only  Past Medical History:   Diagnosis Date    Encounter for screening for malignant neoplasm of cervix 09/18/2017    Pap smear for cervical cancer screening    Encounter for screening mammogram for malignant neoplasm of breast 11/22/2022    Encounter for screening mammogram for breast cancer     Past Surgical History:   Procedure Laterality Date    MR HEAD ANGIO WO IV CONTRAST  11/12/2022    MR HEAD ANGIO WO IV CONTRAST 11/12/2022 [facility]    MR NECK ANGIO WO IV CONTRAST  11/12/2022    MR NECK ANGIO WO IV CONTRAST 11/12/2022 [facility]     Family History   Problem Relation Name Age of Onset    Thyroid disease Mother      Arthritis Mother      Diabetes Mother      Heart disease Father      Gout Father      Gout Brother      Breast cancer Maternal Grandmother      Breast cancer Paternal Grandmother       Social History     Tobacco Use   Smoking Status Never   Smokeless Tobacco Never     Current Outpatient Medications   Medication Sig Dispense Refill    aspirin 81 mg EC tablet Take 1 tablet (81 mg) by mouth once daily.      atorvastatin (Lipitor) 40 mg tablet Take 1 tablet (40 mg) by mouth once daily. 90 tablet 3    baclofen (Lioresal) 10 mg tablet Take 1 tablet (10 mg) by mouth 4 times a day. 360 tablet 3    methotrexate (Trexall) 2.5 mg tablet Take 6 tablets (15 mg total) by mouth 1 (one) time per week.  Follow directions carefully, and ask to explain any part you do not understand. Take exactly as directed. 72 tablet 1    multivitamin with minerals (multivitamin-iron-folic acid) tablet Take 1 tablet by mouth once daily.      nitrofurantoin, macrocrystal-monohydrate, (Macrobid) 100 mg capsule Take 1 capsule (100 mg) by mouth once daily. 90 capsule 3     No current facility-administered medications for this visit.     Allergies   Allergen Reactions    Penicillins Hives     hives    Tolterodine Unknown     Urinary retention     Past medical, surgical, family and social history in  the chart was reviewed and is accurate including any additions to what is in this HPI.    REVIEW OF SYSTEMS (ROS):   Constitutional: denies any unintentional weight loss or change in strength.  Integumentary: denies any rashes or pruritus.  Eyes: denies any double vision or eye pain.  Ear/Nose/Mouth/Throat: denies any nosebleeds or gum bleeds.  Cardiovascular: denies any chest pain or syncope.  Respiratory: denies hemoptysis.  Gastrointestinal: denies nausea or vomiting.  Musculoskeletal: denies muscle cramping or weakness.  Neurologic: denies convulsions or seizures.  Hematologic/Lymphatic: denies bleeding tendencies.  Endocrine: denies heat/cold intolerance.  All other systems have been reviewed and are negative unless otherwise noted in the HPI.     OBJECTIVE:  Visit Vitals  Temp 36.2 °C (97.1 °F)     PHYSICAL EXAM:  Constitutional: No obvious distress.  Eyes: Non-injected conjunctiva, sclera clear, EOMI.  Ears/Nose/Mouth/Throat: No obvious drainage per ears or nose.  Cardiovascular: Extremities are warm and well perfused. No edema, cyanosis or pallor.  Respiratory: No audible wheezing/stridor; respirations do not appear labored.  Gastrointestinal: Abdomen soft, not distended.  Musculoskeletal: Normal ROM of extremities.  Skin: No obvious rashes or open sores.  Neurologic: Alert and oriented, CN 2-12 grossly intact.  Psychiatric: Answers questions appropriately with normal affect.  Hematologic/Lymphatic/Immunologic: No obvious bruises or sites of spontaneous bleeding.  Genitourinary: No CVA tenderness, bladder not palpable.     LABS & IMAGING:  Lab Results   Component Value Date    WBC 7.7 06/04/2024    HGB 12.9 06/04/2024    HCT 41.4 06/04/2024     06/04/2024    CHOL 111 03/26/2024    TRIG 83 03/26/2024    HDL 43.8 03/26/2024    LDLDIRECT 141 (H) 04/23/2018    ALT 43 03/26/2024    AST 40 (H) 03/26/2024     06/04/2024    K 5.0 06/04/2024     06/04/2024    CREATININE 0.76 06/04/2024    BUN 25  (H) 06/04/2024    CO2 29 06/04/2024    INR 1.1 11/11/2022    HGBA1C 5.5 03/26/2024     Scribed for Dr. Prince Don by Lucas Carias.  I, Dr. Prince Don have personally reviewed and agreed with the information entered by the Virtual Scribe. 08/26/24.

## 2024-08-30 ENCOUNTER — OFFICE VISIT (OUTPATIENT)
Dept: WOUND CARE | Facility: CLINIC | Age: 64
End: 2024-08-30
Payer: COMMERCIAL

## 2024-08-30 PROCEDURE — 11042 DBRDMT SUBQ TIS 1ST 20SQCM/<: CPT

## 2024-09-06 ENCOUNTER — OFFICE VISIT (OUTPATIENT)
Dept: WOUND CARE | Facility: CLINIC | Age: 64
End: 2024-09-06
Payer: COMMERCIAL

## 2024-09-06 PROCEDURE — 11042 DBRDMT SUBQ TIS 1ST 20SQCM/<: CPT

## 2024-09-13 ENCOUNTER — OFFICE VISIT (OUTPATIENT)
Dept: WOUND CARE | Facility: CLINIC | Age: 64
End: 2024-09-13
Payer: COMMERCIAL

## 2024-09-13 PROCEDURE — 11042 DBRDMT SUBQ TIS 1ST 20SQCM/<: CPT

## 2024-09-20 ENCOUNTER — OFFICE VISIT (OUTPATIENT)
Dept: WOUND CARE | Facility: CLINIC | Age: 64
End: 2024-09-20
Payer: COMMERCIAL

## 2024-09-20 PROCEDURE — 11042 DBRDMT SUBQ TIS 1ST 20SQCM/<: CPT

## 2024-09-27 ENCOUNTER — OFFICE VISIT (OUTPATIENT)
Dept: WOUND CARE | Facility: CLINIC | Age: 64
End: 2024-09-27
Payer: COMMERCIAL

## 2024-09-27 PROCEDURE — 11042 DBRDMT SUBQ TIS 1ST 20SQCM/<: CPT

## 2024-10-04 ENCOUNTER — OFFICE VISIT (OUTPATIENT)
Dept: WOUND CARE | Facility: CLINIC | Age: 64
End: 2024-10-04
Payer: COMMERCIAL

## 2024-10-04 PROCEDURE — 11042 DBRDMT SUBQ TIS 1ST 20SQCM/<: CPT

## 2024-10-11 ENCOUNTER — OFFICE VISIT (OUTPATIENT)
Dept: WOUND CARE | Facility: CLINIC | Age: 64
End: 2024-10-11
Payer: COMMERCIAL

## 2024-10-11 PROCEDURE — 11042 DBRDMT SUBQ TIS 1ST 20SQCM/<: CPT

## 2024-10-18 ENCOUNTER — OFFICE VISIT (OUTPATIENT)
Dept: WOUND CARE | Facility: CLINIC | Age: 64
End: 2024-10-18
Payer: COMMERCIAL

## 2024-10-18 PROCEDURE — 11042 DBRDMT SUBQ TIS 1ST 20SQCM/<: CPT

## 2024-10-25 ENCOUNTER — OFFICE VISIT (OUTPATIENT)
Dept: WOUND CARE | Facility: CLINIC | Age: 64
End: 2024-10-25
Payer: COMMERCIAL

## 2024-10-25 PROCEDURE — 11042 DBRDMT SUBQ TIS 1ST 20SQCM/<: CPT

## 2024-10-28 DIAGNOSIS — G35 MULTIPLE SCLEROSIS (MULTI): ICD-10-CM

## 2024-10-28 RX ORDER — METHOTREXATE 2.5 MG/1
TABLET ORAL
Qty: 72 TABLET | Refills: 3 | Status: SHIPPED | OUTPATIENT
Start: 2024-10-28

## 2024-11-01 ENCOUNTER — OFFICE VISIT (OUTPATIENT)
Dept: WOUND CARE | Facility: CLINIC | Age: 64
End: 2024-11-01
Payer: COMMERCIAL

## 2024-11-01 PROCEDURE — 11043 DBRDMT MUSC&/FSCA 1ST 20/<: CPT

## 2024-11-08 ENCOUNTER — OFFICE VISIT (OUTPATIENT)
Dept: WOUND CARE | Facility: CLINIC | Age: 64
End: 2024-11-08
Payer: COMMERCIAL

## 2024-11-08 PROCEDURE — 11042 DBRDMT SUBQ TIS 1ST 20SQCM/<: CPT

## 2024-11-09 DIAGNOSIS — N31.9 NEUROGENIC BLADDER: ICD-10-CM

## 2024-11-09 DIAGNOSIS — N20.0 KIDNEY STONES: ICD-10-CM

## 2024-11-09 DIAGNOSIS — N30.90 RECURRENT CYSTITIS: ICD-10-CM

## 2024-11-13 DIAGNOSIS — N31.9 NEUROGENIC BLADDER: ICD-10-CM

## 2024-11-13 DIAGNOSIS — N30.90 RECURRENT CYSTITIS: ICD-10-CM

## 2024-11-13 DIAGNOSIS — N20.0 KIDNEY STONES: ICD-10-CM

## 2024-11-13 RX ORDER — NITROFURANTOIN 25; 75 MG/1; MG/1
100 CAPSULE ORAL DAILY
Qty: 90 CAPSULE | Refills: 3 | Status: SHIPPED | OUTPATIENT
Start: 2024-11-13

## 2024-11-15 ENCOUNTER — OFFICE VISIT (OUTPATIENT)
Dept: WOUND CARE | Facility: CLINIC | Age: 64
End: 2024-11-15
Payer: COMMERCIAL

## 2024-11-15 PROCEDURE — 11042 DBRDMT SUBQ TIS 1ST 20SQCM/<: CPT

## 2024-11-16 RX ORDER — NITROFURANTOIN 25; 75 MG/1; MG/1
100 CAPSULE ORAL DAILY
Qty: 90 CAPSULE | Refills: 3 | Status: SHIPPED | OUTPATIENT
Start: 2024-11-16

## 2024-11-22 ENCOUNTER — OFFICE VISIT (OUTPATIENT)
Dept: WOUND CARE | Facility: CLINIC | Age: 64
End: 2024-11-22
Payer: COMMERCIAL

## 2024-11-22 PROCEDURE — 11042 DBRDMT SUBQ TIS 1ST 20SQCM/<: CPT

## 2024-12-06 ENCOUNTER — OFFICE VISIT (OUTPATIENT)
Dept: WOUND CARE | Facility: CLINIC | Age: 64
End: 2024-12-06
Payer: COMMERCIAL

## 2024-12-06 PROCEDURE — 11042 DBRDMT SUBQ TIS 1ST 20SQCM/<: CPT

## 2024-12-13 ENCOUNTER — APPOINTMENT (OUTPATIENT)
Dept: WOUND CARE | Facility: CLINIC | Age: 64
End: 2024-12-13
Payer: COMMERCIAL

## 2024-12-13 ENCOUNTER — OFFICE VISIT (OUTPATIENT)
Dept: WOUND CARE | Facility: CLINIC | Age: 64
End: 2024-12-13
Payer: COMMERCIAL

## 2024-12-13 PROCEDURE — 11042 DBRDMT SUBQ TIS 1ST 20SQCM/<: CPT | Mod: 59

## 2024-12-13 PROCEDURE — 11043 DBRDMT MUSC&/FSCA 1ST 20/<: CPT

## 2024-12-16 NOTE — TELEPHONE ENCOUNTER
From: Kristi Pat  To: Rhonda Qureshi MD  Sent: 10/26/2023 8:34 AM EDT  Subject: Atorvastatin    Dear Dr. Qureshi,    I need a refill on my Atorvastatin perscription. They should be reaching out to you soon. Please use [facility] for a 90 day supply.    Kristi   Orthopaedic Surgery Daily Progress Note    Date of Service:   12/16/24    s/p 1) RIGHT SHOULDER REMOVAL OF BERTHA ARTHROPLASTY   2) PLACEMENT OF ANTIBIOTIC SPACER  3) IRRIGATION AND DEBRIDEMENT, Date of Procedure:12/13/24  POD1    Subjective:  53 year old female who presents s/p above procedure, doing well postoperatively. With R shoulder pain, no other complaints.  No acute events overnight. Reports pain much improved overnight. Denies numbness or tingling. Denies CP or SOB. Worked with PT yesterday, ambulated hallways.     Objective:  Physical Exam:  Gen: Resting comfortably in no acute distress  CV: Warm and well perfused distal extremities  Resp: Respirations are non-labored   MSK:   RUE: Dressings in place. No visible strikethrough or drainage. Drain in place draining scant serosang fluid. Tender to palpation along R shoulder joint. ROM deferred iso recent surgery. Fires AIN/PIN/Ulnar Nerves. SILT C5-T1 distributions.     Labs:  Visit Vitals  /69   Pulse 78   Temp 98.2 °F (36.8 °C) (Oral)   Resp 18   Ht 5' 5\" (1.651 m)   Wt 77.7 kg (171 lb 6.5 oz)   LMP  (LMP Unknown)   SpO2 97%   BMI 28.52 kg/m²       WBC (K/mcL)   Date Value   12/16/2024 11.4 (H)     HGB (g/dL)   Date Value   12/16/2024 6.7 (LL)     PLT (K/mcL)   Date Value   12/16/2024 337     Sodium (mmol/L)   Date Value   12/16/2024 139     Potassium (mmol/L)   Date Value   12/16/2024 3.9     Glucose (mg/dL)   Date Value   12/16/2024 109 (H)     Creatinine (mg/dL)   Date Value   12/16/2024 0.55       Date 12/15/24 0700 - 12/16/24 0659 12/16/24 0700 - 12/17/24 0659   Shift 6378-2618 9411-5381 3072-7527 24 Hour Total 6256-8394 3342-8646 0072-8818 24 Hour Total   INTAKE   P.O.  400  400       Shift Total  400  400       OUTPUT   Urine  1400 1100 2500       Drains 20 10  30         Output (ml) (Drain 12/13/24 Right Upper extremity Accordion (e.g. Hemovac, etc)) 20 10  30       Shift Total 20 1410 1100 2530       Weight (kg) 77.8 77.8 77.7 77.7 77.7 77.7  77.7 77.7        Assessment/Plan:    53 year old female who presents s/p removal of R shoulder hemiarthroplasty, placement of abx spacer, I&D with Dr. Hess.    - admitted to medicine, ortho on consult  - recommend 1-2uPRBC for anemia Hb 6.7  - Multimodal Pain Control  - Weight Bearing Status:RUE NWB, w/ sling  - PT/OT: ROM ok for ROM elbow and wrist as tolerated, no ROM shoulder  - Antibiotics:Vanc postop, pending ID recs and intra-op Cx(growing MRSA)  - DVT Prophylaxis:  - Lines/drains: RUE HV, ctm output 30cc in last 24 hours  - Dressings: Maintain  - Imaging: reviewed, stable  - Consults: Medicine comanagement, ID recs for PJI, pharmacy to dose vanc    Disposition: pending intra-op cx and final ID recs    Will d/w attending    Rajiv Andrade MD PGY5  Orthopaedic Surgery  Contact via [employer]  After 5PM and on weekends:  Contact ortho resident on call

## 2024-12-20 ENCOUNTER — OFFICE VISIT (OUTPATIENT)
Dept: WOUND CARE | Facility: CLINIC | Age: 64
End: 2024-12-20
Payer: COMMERCIAL

## 2024-12-20 PROCEDURE — 11042 DBRDMT SUBQ TIS 1ST 20SQCM/<: CPT

## 2025-01-02 ENCOUNTER — OFFICE VISIT (OUTPATIENT)
Dept: WOUND CARE | Facility: CLINIC | Age: 65
End: 2025-01-02
Payer: COMMERCIAL

## 2025-01-02 PROCEDURE — 11042 DBRDMT SUBQ TIS 1ST 20SQCM/<: CPT

## 2025-01-10 ENCOUNTER — OFFICE VISIT (OUTPATIENT)
Dept: WOUND CARE | Facility: CLINIC | Age: 65
End: 2025-01-10
Payer: COMMERCIAL

## 2025-01-10 PROCEDURE — 11042 DBRDMT SUBQ TIS 1ST 20SQCM/<: CPT

## 2025-01-17 ENCOUNTER — APPOINTMENT (OUTPATIENT)
Dept: WOUND CARE | Facility: CLINIC | Age: 65
End: 2025-01-17
Payer: COMMERCIAL

## 2025-01-24 ENCOUNTER — OFFICE VISIT (OUTPATIENT)
Dept: WOUND CARE | Facility: CLINIC | Age: 65
End: 2025-01-24
Payer: COMMERCIAL

## 2025-01-24 PROCEDURE — 11042 DBRDMT SUBQ TIS 1ST 20SQCM/<: CPT

## 2025-02-07 ENCOUNTER — OFFICE VISIT (OUTPATIENT)
Dept: WOUND CARE | Facility: CLINIC | Age: 65
End: 2025-02-07
Payer: COMMERCIAL

## 2025-02-07 PROCEDURE — 11042 DBRDMT SUBQ TIS 1ST 20SQCM/<: CPT

## 2025-02-21 ENCOUNTER — OFFICE VISIT (OUTPATIENT)
Dept: WOUND CARE | Facility: CLINIC | Age: 65
End: 2025-02-21
Payer: COMMERCIAL

## 2025-02-21 PROCEDURE — 11042 DBRDMT SUBQ TIS 1ST 20SQCM/<: CPT

## 2025-03-07 ENCOUNTER — OFFICE VISIT (OUTPATIENT)
Dept: WOUND CARE | Facility: CLINIC | Age: 65
End: 2025-03-07
Payer: COMMERCIAL

## 2025-03-07 PROCEDURE — 11042 DBRDMT SUBQ TIS 1ST 20SQCM/<: CPT

## 2025-03-13 DIAGNOSIS — E78.5 HYPERLIPIDEMIA, UNSPECIFIED HYPERLIPIDEMIA TYPE: ICD-10-CM

## 2025-03-13 RX ORDER — ATORVASTATIN CALCIUM 40 MG/1
40 TABLET, FILM COATED ORAL DAILY
Qty: 90 TABLET | Refills: 0 | Status: SHIPPED | OUTPATIENT
Start: 2025-03-13

## 2025-03-19 ENCOUNTER — PATIENT MESSAGE (OUTPATIENT)
Dept: NEUROLOGY | Facility: CLINIC | Age: 65
End: 2025-03-19
Payer: COMMERCIAL

## 2025-03-19 ASSESSMENT — PROMIS GLOBAL HEALTH SCALE
CARRYOUT_SOCIAL_ACTIVITIES: GOOD
RATE_AVERAGE_PAIN: 2
CARRYOUT_PHYSICAL_ACTIVITIES: NOT AT ALL
EMOTIONAL_PROBLEMS: RARELY
RATE_AVERAGE_FATIGUE: MILD
RATE_SOCIAL_SATISFACTION: VERY GOOD
RATE_MENTAL_HEALTH: VERY GOOD
RATE_GENERAL_HEALTH: GOOD
RATE_QUALITY_OF_LIFE: GOOD
RATE_PHYSICAL_HEALTH: GOOD

## 2025-03-21 ENCOUNTER — OFFICE VISIT (OUTPATIENT)
Dept: WOUND CARE | Facility: CLINIC | Age: 65
End: 2025-03-21
Payer: COMMERCIAL

## 2025-03-21 PROCEDURE — 11042 DBRDMT SUBQ TIS 1ST 20SQCM/<: CPT

## 2025-03-24 ENCOUNTER — APPOINTMENT (OUTPATIENT)
Dept: PRIMARY CARE | Facility: CLINIC | Age: 65
End: 2025-03-24
Payer: COMMERCIAL

## 2025-03-24 VITALS
DIASTOLIC BLOOD PRESSURE: 73 MMHG | SYSTOLIC BLOOD PRESSURE: 112 MMHG | OXYGEN SATURATION: 97 % | TEMPERATURE: 98.3 F | HEART RATE: 89 BPM

## 2025-03-24 DIAGNOSIS — E55.9 VITAMIN D DEFICIENCY: ICD-10-CM

## 2025-03-24 DIAGNOSIS — H61.23 BILATERAL IMPACTED CERUMEN: ICD-10-CM

## 2025-03-24 DIAGNOSIS — N31.9 NEUROGENIC BLADDER: ICD-10-CM

## 2025-03-24 DIAGNOSIS — G35 MULTIPLE SCLEROSIS (MULTI): ICD-10-CM

## 2025-03-24 DIAGNOSIS — Z00.00 PHYSICAL EXAM: Primary | ICD-10-CM

## 2025-03-24 DIAGNOSIS — Z12.31 ENCOUNTER FOR SCREENING MAMMOGRAM FOR MALIGNANT NEOPLASM OF BREAST: ICD-10-CM

## 2025-03-24 DIAGNOSIS — Z86.39 HISTORY OF THYROID DISEASE: ICD-10-CM

## 2025-03-24 DIAGNOSIS — E78.5 HYPERLIPIDEMIA, UNSPECIFIED HYPERLIPIDEMIA TYPE: ICD-10-CM

## 2025-03-24 DIAGNOSIS — Z78.0 MENOPAUSE: ICD-10-CM

## 2025-03-24 PROCEDURE — 90677 PCV20 VACCINE IM: CPT | Performed by: INTERNAL MEDICINE

## 2025-03-24 PROCEDURE — 90471 IMMUNIZATION ADMIN: CPT | Performed by: INTERNAL MEDICINE

## 2025-03-24 PROCEDURE — 99396 PREV VISIT EST AGE 40-64: CPT | Performed by: INTERNAL MEDICINE

## 2025-03-24 PROCEDURE — 1036F TOBACCO NON-USER: CPT | Performed by: INTERNAL MEDICINE

## 2025-03-24 PROCEDURE — 69209 REMOVE IMPACTED EAR WAX UNI: CPT | Performed by: INTERNAL MEDICINE

## 2025-03-24 RX ORDER — BACLOFEN 10 MG/1
10 TABLET ORAL 4 TIMES DAILY
Qty: 360 TABLET | Refills: 3 | Status: SHIPPED | OUTPATIENT
Start: 2025-03-24 | End: 2026-03-24

## 2025-03-24 RX ORDER — ATORVASTATIN CALCIUM 40 MG/1
40 TABLET, FILM COATED ORAL DAILY
Qty: 90 TABLET | Refills: 3 | Status: SHIPPED | OUTPATIENT
Start: 2025-03-24

## 2025-03-24 ASSESSMENT — ANXIETY QUESTIONNAIRES
7. FEELING AFRAID AS IF SOMETHING AWFUL MIGHT HAPPEN: NOT AT ALL
4. TROUBLE RELAXING: SEVERAL DAYS
5. BEING SO RESTLESS THAT IT IS HARD TO SIT STILL: NOT AT ALL
1. FEELING NERVOUS, ANXIOUS, OR ON EDGE: NOT AT ALL
6. BECOMING EASILY ANNOYED OR IRRITABLE: NOT AT ALL
3. WORRYING TOO MUCH ABOUT DIFFERENT THINGS: NOT AT ALL
GAD7 TOTAL SCORE: 3
2. NOT BEING ABLE TO STOP OR CONTROL WORRYING: MORE THAN HALF THE DAYS
IF YOU CHECKED OFF ANY PROBLEMS ON THIS QUESTIONNAIRE, HOW DIFFICULT HAVE THESE PROBLEMS MADE IT FOR YOU TO DO YOUR WORK, TAKE CARE OF THINGS AT HOME, OR GET ALONG WITH OTHER PEOPLE: VERY DIFFICULT

## 2025-03-24 ASSESSMENT — PATIENT HEALTH QUESTIONNAIRE - PHQ9
1. LITTLE INTEREST OR PLEASURE IN DOING THINGS: NOT AT ALL
4. FEELING TIRED OR HAVING LITTLE ENERGY: SEVERAL DAYS
3. TROUBLE FALLING OR STAYING ASLEEP OR SLEEPING TOO MUCH: NOT AT ALL
2. FEELING DOWN, DEPRESSED OR HOPELESS: SEVERAL DAYS
6. FEELING BAD ABOUT YOURSELF - OR THAT YOU ARE A FAILURE OR HAVE LET YOURSELF OR YOUR FAMILY DOWN: NOT AT ALL
SUM OF ALL RESPONSES TO PHQ QUESTIONS 1-9: 2
8. MOVING OR SPEAKING SO SLOWLY THAT OTHER PEOPLE COULD HAVE NOTICED. OR THE OPPOSITE, BEING SO FIGETY OR RESTLESS THAT YOU HAVE BEEN MOVING AROUND A LOT MORE THAN USUAL: NOT AT ALL
10. IF YOU CHECKED OFF ANY PROBLEMS, HOW DIFFICULT HAVE THESE PROBLEMS MADE IT FOR YOU TO DO YOUR WORK, TAKE CARE OF THINGS AT HOME, OR GET ALONG WITH OTHER PEOPLE: VERY DIFFICULT
9. THOUGHTS THAT YOU WOULD BE BETTER OFF DEAD, OR OF HURTING YOURSELF: NOT AT ALL
5. POOR APPETITE OR OVEREATING: NOT AT ALL
SUM OF ALL RESPONSES TO PHQ9 QUESTIONS 1 AND 2: 1
7. TROUBLE CONCENTRATING ON THINGS, SUCH AS READING THE NEWSPAPER OR WATCHING TELEVISION: NOT AT ALL

## 2025-03-24 NOTE — ASSESSMENT & PLAN NOTE
Will irrigate the ears  Orders:    baclofen (Lioresal) 10 mg tablet; Take 1 tablet (10 mg) by mouth 4 times a day.

## 2025-03-24 NOTE — PROGRESS NOTES
"Patient ID: Kristi Pat is a 64 y.o. female.    Ear Cerumen Removal    Date/Time: 3/24/2025 11:53 AM    Performed by: Rhonda Qureshi MD  Authorized by: Rhonda Qureshi MD    Consent:     Consent obtained:  Verbal    Risks discussed:  Pain  Universal protocol:     Patient identity confirmed:  Verbally with patient  Procedure details:     Location:  L ear and R ear    Procedure type: irrigation      Procedure outcomes: cerumen removed    Post-procedure details:     Inspection:  TM intact    Hearing quality:  Mindi Qureshi MD  SUBJECTIVE:     Kristi Pat is a 64 y.o. female presenting for her annual physical/wellness.  Pt with MS, hyperlipidemia.  Stable  No concerns.    Colon screening:  Up to date   Last pap: Up to date   Last mammogram:  due  Dexa due  Vaccines should get prevnar 20  Diet:  healthy in general  Exercises:  regularly, in her chair, tries to get up and walk around. Does arm and leg exercises  Due to see dermatologist for skin screening. They will schedule with [facility]      Lab Results   Component Value Date    HGBA1C 5.5 03/26/2024     Lab Results   Component Value Date    CREATININE 0.76 06/04/2024     Lab Results   Component Value Date    WBC 7.7 06/04/2024    HGB 12.9 06/04/2024    HCT 41.4 06/04/2024    MCV 92 06/04/2024     06/04/2024     Lab Results   Component Value Date    CHOL 111 03/26/2024    CHOL 215 (H) 04/23/2018     Lab Results   Component Value Date    HDL 43.8 03/26/2024    HDL 54.8 04/23/2018     Lab Results   Component Value Date    LDLCALC 51 03/26/2024     Lab Results   Component Value Date    TRIG 83 03/26/2024     No components found for: \"CHOLHDL\"       ROS:   Feeling well. No dyspnea or chest pain on exertion. No abdominal pain, change in bowel habits, black or bloody stools. No urinary tract or  symptoms.  No breast concerns.      OBJECTIVE:   The patient appears well, alert, oriented x 3, in no distress.   /73   Pulse 89   Temp 36.8 °C (98.3 °F)  "  SpO2 97%   ENT normal.  Neck supple. No adenopathy or thyromegaly. GENARO. Lungs are clear, good air entry, no wheezes, rhonchi or rales. S1 and S2 normal, no murmurs, regular rate and rhythm. Abdomen is soft without tenderness, guarding, mass or organomegaly.  exam deferred to Gyn. Extremities show no edema, normal peripheral pulses. Neurological is normal without focal findings.    Assessment & Plan  Physical exam    Orders:  •  CBC; Future  •  Comprehensive Metabolic Panel; Future  •  Hemoglobin A1C; Future  •  Lipid Panel; Future    Neurogenic bladder    Orders:  •  baclofen (Lioresal) 10 mg tablet; Take 1 tablet (10 mg) by mouth 4 times a day.    Encounter for screening mammogram for malignant neoplasm of breast    Orders:  •  BI mammo bilateral screening tomosynthesis; Future    Vitamin D deficiency    Orders:  •  Vitamin D 25-Hydroxy,Total (for eval of Vitamin D levels); Future    Hyperlipidemia, unspecified hyperlipidemia type    Orders:  •  atorvastatin (Lipitor) 40 mg tablet; Take 1 tablet (40 mg) by mouth once daily.    Menopause    Orders:  •  XR DEXA bone density; Future    Multiple sclerosis (Multi)  Will irrigate the ears  Orders:  •  baclofen (Lioresal) 10 mg tablet; Take 1 tablet (10 mg) by mouth 4 times a day.    Bilateral impacted cerumen         Acquired hypothyroidism  Check TSH        Prevnar 20

## 2025-04-04 ENCOUNTER — OFFICE VISIT (OUTPATIENT)
Dept: WOUND CARE | Facility: CLINIC | Age: 65
End: 2025-04-04
Payer: COMMERCIAL

## 2025-04-04 ENCOUNTER — APPOINTMENT (OUTPATIENT)
Dept: WOUND CARE | Facility: CLINIC | Age: 65
End: 2025-04-04
Payer: COMMERCIAL

## 2025-04-04 DIAGNOSIS — L89.313 PRESSURE ULCER OF RIGHT BUTTOCK, STAGE 3 (MULTI): Primary | ICD-10-CM

## 2025-04-04 PROCEDURE — 11042 DBRDMT SUBQ TIS 1ST 20SQCM/<: CPT

## 2025-04-06 LAB
BACTERIA SPEC AEROBE CULT: ABNORMAL
BACTERIA SPEC ANAEROBE CULT: ABNORMAL

## 2025-04-10 ENCOUNTER — OFFICE VISIT (OUTPATIENT)
Dept: NEUROLOGY | Facility: CLINIC | Age: 65
End: 2025-04-10
Payer: COMMERCIAL

## 2025-04-10 VITALS
BODY MASS INDEX: 17.72 KG/M2 | HEART RATE: 97 BPM | DIASTOLIC BLOOD PRESSURE: 69 MMHG | RESPIRATION RATE: 18 BRPM | WEIGHT: 120 LBS | SYSTOLIC BLOOD PRESSURE: 107 MMHG

## 2025-04-10 DIAGNOSIS — G35 MULTIPLE SCLEROSIS (MULTI): Primary | ICD-10-CM

## 2025-04-10 LAB
BACTERIA SPEC AEROBE CULT: ABNORMAL
BACTERIA SPEC ANAEROBE CULT: ABNORMAL

## 2025-04-10 PROCEDURE — 99215 OFFICE O/P EST HI 40 MIN: CPT | Performed by: PSYCHIATRY & NEUROLOGY

## 2025-04-10 PROCEDURE — 99417 PROLNG OP E/M EACH 15 MIN: CPT | Performed by: PSYCHIATRY & NEUROLOGY

## 2025-04-10 PROCEDURE — 1036F TOBACCO NON-USER: CPT | Performed by: PSYCHIATRY & NEUROLOGY

## 2025-04-10 ASSESSMENT — PAIN SCALES - GENERAL: PAINLEVEL_OUTOF10: 0-NO PAIN

## 2025-04-10 NOTE — PROGRESS NOTES
"Chief Complaint  f/u visit for MS      History Summary  Ms. Guerrero is a 65yo F with hx of MS (dx 1985, used to be on Copaxone, then on MTX). She last seen Dr. Petty back in 2014 before he retired then switched care to Dr. Jaime at [facility] for continuity of care.  She presented to our MS clinic to re-establish care at .  She used to mobilize using a rollator-walker. However, after she fell and had an ulcer and wound at the buttock area she switched to an electronic scooter in an attempt to avoid falling and worsening the wound.  In Nov '22 had an episode of fragmented speech, could not make sense for 25 minutes, no other symptoms, could understand fine, diagnosed with TIA at , started statin and ASA.     MRI brain 05/23 vs  11/22 and 03/21  1. Redemonstrated is a moderate degree of patchy and confluent signal   within the bilateral cerebral hemispheres and antonio compatible with   chronic small vessel ischemic change and or demyelinating disease.   The bilateral brachium pontis and right cerebellar signal is less   conspicuous on the current exam. No definite new abnormal signal. No   diffusion restricting or enhancing lesions on the current exam.   2. No acute intracranial infarct or mass effect.   3. Parenchymal volume loss is similar to the prior exam.   MRI brain 11/21 vs 3/21: apparent new changes in both middle cerebellar peduncles on FLAIR, less obvious on T2     Last seen 4/24:  Wound on the hip has \"finally healed\". No infections. Was in bed and had a cold, chills yesterday but feels better today. They have a sit to stand lift, she is able to push up with her legs. At night in bed feels like she has socks on but she does not, no other associated symptoms. She feels stable but admits her legs might be weaker.    Interval Hx:  A week and a half ago she twisted her left leg as she was using the lift to get up, \"big bruise\" on the thigh but did not go to the hospital, getting better. She does not feel her " baclofen helps with her spasms, which happen at night regularly disrupting her sleep,   3-4 mo ago developed a new wound on the right hip (pos for pseudomonas, added abx recently), taking longer to heal, which postponed her plan to start PT.  Overall, she feels pretty stable and stays active.     Patient Active Problem List   Diagnosis    Wound of right buttock    Open wound of buttock    Vitamin D deficiency    UTI (urinary tract infection)    Tubular adenoma of colon    Transient ischemic attack    TIA (transient ischemic attack)    Severe protein-calorie malnutrition (Multi)    Sacral wound    Pressure injury of sacral region, stage 2 (Multi)    Normocytic anemia    Neurogenic bladder    Multiple sclerosis (Multi)    Kidney stones    Increased frequency of urination    Incomplete bladder emptying    Impacted cerumen    Hyperlipidemia    Hyperlipemia    Contact with and (suspected) exposure to covid-19    Bladder stones    Retention of urine, unspecified    Acute retention of urine    Acute left ankle pain    Acquired hypothyroidism    Pseudomonas (aeruginosa) (mallei) (pseudomallei) as the cause of diseases classified elsewhere    Pressure injury of right buttock, unstageable (Multi)    Allergy status to penicillin    Dependence on wheelchair          Current Outpatient Medications:     aspirin 81 mg EC tablet, Take 1 tablet (81 mg) by mouth once daily., Disp: , Rfl:     atorvastatin (Lipitor) 40 mg tablet, Take 1 tablet (40 mg) by mouth once daily., Disp: 90 tablet, Rfl: 3    baclofen (Lioresal) 10 mg tablet, Take 1 tablet (10 mg) by mouth 4 times a day., Disp: 360 tablet, Rfl: 3    methotrexate (Trexall) 2.5 mg tablet, TAKE 6 TABLETS ONCE EVERY WEEK. FOLLOW DIRECTIONS CAREFULLY AND ASK TO EXPLAIN ANY PART YOU DO NOT UNDERSTAND. TAKE EXACTLY AS DIRECTED., Disp: 72 tablet, Rfl: 3    multivitamin with minerals (multivitamin-iron-folic acid) tablet, Take 1 tablet by mouth once daily., Disp: , Rfl:      nitrofurantoin, macrocrystal-monohydrate, (Macrobid) 100 mg capsule, TAKE 1 CAPSULE DAILY, Disp: 90 capsule, Rfl: 3    nitrofurantoin, macrocrystal-monohydrate, (Macrobid) 100 mg capsule, Take 1 capsule (100 mg) by mouth once daily., Disp: 90 capsule, Rfl: 3     /69 (BP Location: Right arm, Patient Position: Standing, BP Cuff Size: Adult)   Pulse 97   Resp 18   Wt 54.4 kg (120 lb) Comment: Pt. stated weight  BMI 17.72 kg/m²     Physical Exam  GENERAL APPEARANCE: No distress, alert, interactive and cooperative.   MENTAL STATE:   Orientation was normal to time, place and person. Recent and remote memory was intact. Attention span and concentration were normal. Language nl.  CRANIAL NERVES:   CN 3, 4, 6   EOMs normal alignment, full range with normal saccades, pursuit and convergence.   CN 5   Facial sensation intact bilaterally.   CN 7   Normal and symmetric facial strength.     MOTOR:   Muscle bulk decreased in both UEs and LEs. Mild spasticty on left leg.   Muscle strength was 5/5 in distal and proximal muscles in both UEs, but R hip flexion 3/5, R knee extension 5-/5, R knee flexion 4-/5, R dorsiflexion and planter flexion 4-/5, L hip flexion 2/5, L knee extension 4-/5, L knee flexion 3/5, L dorsiflexion and planter flexion 3-4-/5.      04/24  PEG R 49.97 L 52.79  GAIT: not possible, patient in WC    04/25  PEG R 44.11 L 38.93  GAIT: not possible, patient in        Provider Impressions  ASSESSMENT:  Ms. Guerrero is a 63yo F with hx of PPMS (dx 1985, used to be on Copaxone, now on MTX). She had presented to our clinic to re-establish care with  due to insurance issues. Episode of aphasia, TIA, in Nov 2022. MRI done at that time showed possible new lesions in both middle cerebellar peduncles, which was puzzling as she had no new symptoms as expected usually with new infratentorial lesions. Brain MRI stable since. I also do not expect her disease to be active at this stage.     She feels she is stable and  would like to c/w methotrexate, aware of the risks of infections in her age range.   She will increase the baclofen night dose from 10mg to 20mg. No significant spasticity on exam but muscle spasms disrupt her sleep. I will refer her to Dr Steiner' spasticity clinic.  Once she is ready for PT, she will let me know.    Kristi was seen today for multiple sclerosis.  Diagnoses and all orders for this visit:  Multiple sclerosis (Multi) (Primary)  -     CBC and Auto Differential; Future  -     Disability Placard  -     Follow Up In Neurology; Future 1 year  -     MR brain w and wo IV contrast; May 2025  -     Referral to Neurology; Future

## 2025-04-13 ENCOUNTER — OFFICE VISIT (OUTPATIENT)
Dept: URGENT CARE | Age: 65
End: 2025-04-13
Payer: COMMERCIAL

## 2025-04-13 VITALS — HEART RATE: 99 BPM | SYSTOLIC BLOOD PRESSURE: 118 MMHG | DIASTOLIC BLOOD PRESSURE: 72 MMHG | OXYGEN SATURATION: 99 %

## 2025-04-13 DIAGNOSIS — M25.452 SWELLING OF JOINT OF PELVIC REGION OR THIGH, LEFT: Primary | ICD-10-CM

## 2025-04-13 PROCEDURE — 1036F TOBACCO NON-USER: CPT | Performed by: NURSE PRACTITIONER

## 2025-04-13 PROCEDURE — 99203 OFFICE O/P NEW LOW 30 MIN: CPT | Performed by: NURSE PRACTITIONER

## 2025-04-13 NOTE — PROGRESS NOTES
Subjective   Patient ID: Kristi Pat is a 64 y.o. female. They present today with a chief complaint of Injury (Leg/thigh pain ).    History of Present Illness  63 yo female coming in for bruising and swelling of the left thigh. She states 2 weeks ago she was using her lift to stand and her leg got caught in the straps. She states she had pain when this happened but no pain now. She states the swelling has come down but there is still swelling there and bruising. She states she believes she tore a muscle in the leg but wanted someone to see it. She denies any other complaints today.     Past Medical History  Allergies as of 04/13/2025 - Reviewed 04/13/2025   Allergen Reaction Noted    Penicillins Hives 06/25/2001    Tolterodine Unknown 03/01/2000       (Not in a hospital admission)       Past Medical History:   Diagnosis Date    Encounter for screening for malignant neoplasm of cervix 09/18/2017    Pap smear for cervical cancer screening    Encounter for screening mammogram for malignant neoplasm of breast 11/22/2022    Encounter for screening mammogram for breast cancer       Past Surgical History:   Procedure Laterality Date    MR HEAD ANGIO WO IV CONTRAST  11/12/2022    MR HEAD ANGIO WO IV CONTRAST 11/12/2022 [facility]    MR NECK ANGIO WO IV CONTRAST  11/12/2022    MR NECK ANGIO WO IV CONTRAST 11/12/2022 [facility] EMERGENCY LEGACY        reports that she quit smoking about 40 years ago. Her smoking use included cigarettes. She started smoking about 45 years ago. She has a 1.3 pack-year smoking history. She has never used smokeless tobacco. She reports that she does not currently use alcohol. She reports that she does not use drugs.    Review of Systems  Review of Systems:  General: No weight loss, fatigue, anorexia, insomnia, fever, chills.  Cardiac: No chest pain, palpitations, syncope, near syncope.  Pulmonary:  No shortness of breath, cough, hemoptysis  Heme/lymph: No swollen glands, fever,  bleeding  Musculoskeletal: No limb pain, Positive left thigh swelling and bruising  Skin: No rashes                                 Objective    Vitals:    04/13/25 1035   BP: 118/72   Pulse: 99   SpO2: 99%     No LMP recorded. Patient is postmenopausal.    Physical Exam  Physical Exam:  General: Vital noted, no distress. Afebrile  Cardiac: Regular rate and rhythm, no murmur  Pulmonary: Lungs clear bilaterally with good aeration. No adventitious breath sounds.  Musculoskeletal: Edema is noted of the left thigh with ecchymosis. No warmth or erythema is noted. The muscle is tight and bulging mildly at the distal portion of the quadriceps.  No tenderness on palpation is noted in the pelvic region. No tenderness to the calf.   Skin: No rashes    Procedures    Point of Care Test & Imaging Results from this visit  No results found for this visit on 04/13/25.   Imaging  No results found.    Cardiology, Vascular, and Other Imaging  No other imaging results found for the past 2 days      Diagnostic study results (if any) were reviewed by SNEHA Eng.    Assessment/Plan   Allergies, medications, history, and pertinent labs/EKGs/Imaging reviewed by SNEHA Eng.     Medical Decision Making  Testing: US left lower extremity  Treatment: Patient advised to ice the area. Will call her with US results tomorrow. Ortho referral placed.  Differential: 1)  Torn quadriceps muscle, 2) DVT , 3) muscle strain  Plan: Patient will follow up with the PCP in the next 2-3 days. Return for any worsening symptoms or go to the ER for further evaluation. Patient understands return precautions and discharge insturctions.  Impression:   1) quadriceps muscle injury      Orders and Diagnoses  Diagnoses and all orders for this visit:  Swelling of joint of pelvic region or thigh, left  -     Vascular US Lower Extremity Venous Duplex Left; Future  -     Referral to Orthopedics and Sports Medicine; Future      Medical Admin  Record      Patient disposition: Home    Electronically signed by SNEHA Eng  10:54 AM

## 2025-04-14 ENCOUNTER — APPOINTMENT (OUTPATIENT)
Dept: RADIOLOGY | Facility: HOSPITAL | Age: 65
End: 2025-04-14
Payer: COMMERCIAL

## 2025-04-14 ENCOUNTER — HOSPITAL ENCOUNTER (EMERGENCY)
Facility: HOSPITAL | Age: 65
Discharge: ANOTHER HOSPITAL | End: 2025-04-15
Attending: STUDENT IN AN ORGANIZED HEALTH CARE EDUCATION/TRAINING PROGRAM
Payer: COMMERCIAL

## 2025-04-14 ENCOUNTER — HOSPITAL ENCOUNTER (OUTPATIENT)
Dept: RADIOLOGY | Facility: CLINIC | Age: 65
Discharge: HOME | End: 2025-04-14
Payer: COMMERCIAL

## 2025-04-14 VITALS
RESPIRATION RATE: 18 BRPM | TEMPERATURE: 96.8 F | HEART RATE: 85 BPM | BODY MASS INDEX: 17.77 KG/M2 | HEIGHT: 69 IN | WEIGHT: 120 LBS | SYSTOLIC BLOOD PRESSURE: 103 MMHG | OXYGEN SATURATION: 100 % | DIASTOLIC BLOOD PRESSURE: 63 MMHG

## 2025-04-14 DIAGNOSIS — M25.452 SWELLING OF JOINT OF PELVIC REGION OR THIGH, LEFT: ICD-10-CM

## 2025-04-14 DIAGNOSIS — I82.412 ACUTE DEEP VEIN THROMBOSIS (DVT) OF FEMORAL VEIN OF LEFT LOWER EXTREMITY: ICD-10-CM

## 2025-04-14 DIAGNOSIS — S72.402A CLOSED FRACTURE OF DISTAL END OF LEFT FEMUR, UNSPECIFIED FRACTURE MORPHOLOGY, INITIAL ENCOUNTER: Primary | ICD-10-CM

## 2025-04-14 LAB
ABO GROUP (TYPE) IN BLOOD: NORMAL
ALBUMIN SERPL BCP-MCNC: 3.5 G/DL (ref 3.4–5)
ALP SERPL-CCNC: 164 U/L (ref 33–136)
ALT SERPL W P-5'-P-CCNC: 19 U/L (ref 7–45)
ANION GAP SERPL CALC-SCNC: 9 MMOL/L (ref 10–20)
ANTIBODY SCREEN: NORMAL
APTT PPP: 29 SECONDS (ref 26–36)
AST SERPL W P-5'-P-CCNC: 34 U/L (ref 9–39)
BASOPHILS # BLD AUTO: 0.02 X10*3/UL (ref 0–0.1)
BASOPHILS NFR BLD AUTO: 0.4 %
BILIRUB SERPL-MCNC: 1.5 MG/DL (ref 0–1.2)
BUN SERPL-MCNC: 19 MG/DL (ref 6–23)
CALCIUM SERPL-MCNC: 8.9 MG/DL (ref 8.6–10.3)
CHLORIDE SERPL-SCNC: 109 MMOL/L (ref 98–107)
CO2 SERPL-SCNC: 27 MMOL/L (ref 21–32)
CREAT SERPL-MCNC: 0.74 MG/DL (ref 0.5–1.05)
EGFRCR SERPLBLD CKD-EPI 2021: >90 ML/MIN/1.73M*2
EOSINOPHIL # BLD AUTO: 0.05 X10*3/UL (ref 0–0.7)
EOSINOPHIL NFR BLD AUTO: 1 %
ERYTHROCYTE [DISTWIDTH] IN BLOOD BY AUTOMATED COUNT: 17.5 % (ref 11.5–14.5)
GLUCOSE SERPL-MCNC: 83 MG/DL (ref 74–99)
HCT VFR BLD AUTO: 33.6 % (ref 36–46)
HGB BLD-MCNC: 10.2 G/DL (ref 12–16)
IMM GRANULOCYTES # BLD AUTO: 0.01 X10*3/UL (ref 0–0.7)
IMM GRANULOCYTES NFR BLD AUTO: 0.2 % (ref 0–0.9)
INR PPP: 1.1 (ref 0.9–1.1)
LYMPHOCYTES # BLD AUTO: 0.86 X10*3/UL (ref 1.2–4.8)
LYMPHOCYTES NFR BLD AUTO: 17 %
MCH RBC QN AUTO: 30.4 PG (ref 26–34)
MCHC RBC AUTO-ENTMCNC: 30.4 G/DL (ref 32–36)
MCV RBC AUTO: 100 FL (ref 80–100)
MONOCYTES # BLD AUTO: 0.31 X10*3/UL (ref 0.1–1)
MONOCYTES NFR BLD AUTO: 6.1 %
NEUTROPHILS # BLD AUTO: 3.81 X10*3/UL (ref 1.2–7.7)
NEUTROPHILS NFR BLD AUTO: 75.3 %
NRBC BLD-RTO: 0 /100 WBCS (ref 0–0)
PLATELET # BLD AUTO: 215 X10*3/UL (ref 150–450)
POTASSIUM SERPL-SCNC: 4.3 MMOL/L (ref 3.5–5.3)
PROT SERPL-MCNC: 6 G/DL (ref 6.4–8.2)
PROTHROMBIN TIME: 12.3 SECONDS (ref 9.8–12.4)
RBC # BLD AUTO: 3.36 X10*6/UL (ref 4–5.2)
RH FACTOR (ANTIGEN D): NORMAL
SODIUM SERPL-SCNC: 141 MMOL/L (ref 136–145)
WBC # BLD AUTO: 5.1 X10*3/UL (ref 4.4–11.3)

## 2025-04-14 PROCEDURE — 85025 COMPLETE CBC W/AUTO DIFF WBC: CPT

## 2025-04-14 PROCEDURE — 73560 X-RAY EXAM OF KNEE 1 OR 2: CPT | Mod: LEFT SIDE | Performed by: RADIOLOGY

## 2025-04-14 PROCEDURE — 36415 COLL VENOUS BLD VENIPUNCTURE: CPT

## 2025-04-14 PROCEDURE — 93971 EXTREMITY STUDY: CPT | Performed by: RADIOLOGY

## 2025-04-14 PROCEDURE — 80053 COMPREHEN METABOLIC PANEL: CPT

## 2025-04-14 PROCEDURE — 73590 X-RAY EXAM OF LOWER LEG: CPT | Mod: LEFT SIDE | Performed by: RADIOLOGY

## 2025-04-14 PROCEDURE — 73552 X-RAY EXAM OF FEMUR 2/>: CPT | Mod: LT

## 2025-04-14 PROCEDURE — 73590 X-RAY EXAM OF LOWER LEG: CPT | Mod: LT

## 2025-04-14 PROCEDURE — 86901 BLOOD TYPING SEROLOGIC RH(D): CPT | Performed by: STUDENT IN AN ORGANIZED HEALTH CARE EDUCATION/TRAINING PROGRAM

## 2025-04-14 PROCEDURE — 93971 EXTREMITY STUDY: CPT

## 2025-04-14 PROCEDURE — 73700 CT LOWER EXTREMITY W/O DYE: CPT | Mod: LT

## 2025-04-14 PROCEDURE — 73560 X-RAY EXAM OF KNEE 1 OR 2: CPT | Mod: LT

## 2025-04-14 PROCEDURE — 73552 X-RAY EXAM OF FEMUR 2/>: CPT | Mod: LEFT SIDE | Performed by: RADIOLOGY

## 2025-04-14 PROCEDURE — 85610 PROTHROMBIN TIME: CPT

## 2025-04-14 PROCEDURE — 99285 EMERGENCY DEPT VISIT HI MDM: CPT | Performed by: STUDENT IN AN ORGANIZED HEALTH CARE EDUCATION/TRAINING PROGRAM

## 2025-04-14 RX ORDER — LEVOFLOXACIN 500 MG/1
500 TABLET, FILM COATED ORAL DAILY
COMMUNITY

## 2025-04-14 ASSESSMENT — PAIN - FUNCTIONAL ASSESSMENT: PAIN_FUNCTIONAL_ASSESSMENT: 0-10

## 2025-04-14 ASSESSMENT — COLUMBIA-SUICIDE SEVERITY RATING SCALE - C-SSRS
6. HAVE YOU EVER DONE ANYTHING, STARTED TO DO ANYTHING, OR PREPARED TO DO ANYTHING TO END YOUR LIFE?: NO
2. HAVE YOU ACTUALLY HAD ANY THOUGHTS OF KILLING YOURSELF?: NO
1. IN THE PAST MONTH, HAVE YOU WISHED YOU WERE DEAD OR WISHED YOU COULD GO TO SLEEP AND NOT WAKE UP?: NO

## 2025-04-14 ASSESSMENT — ACTIVITIES OF DAILY LIVING (ADL): LACK_OF_TRANSPORTATION: NO

## 2025-04-14 ASSESSMENT — PAIN DESCRIPTION - FREQUENCY: FREQUENCY: INTERMITTENT

## 2025-04-14 ASSESSMENT — PAIN SCALES - GENERAL: PAINLEVEL_OUTOF10: 0 - NO PAIN

## 2025-04-14 NOTE — PROGRESS NOTES
Transitional Care Coordination Progress Note:  Plan per Medical/Surgical team: treatment of fall with left femur fracture, ortho consult- ?surgery  Status: ED   Payor source: medical [employer]   Discharge disposition: Home with  (Healthy @ home program?)  Potential Barriers: multiple sclerosis   ADOD: 4/16/2025   QIAN Ortiz RN, BSN Transitional Care Coordinator ED# [phone]      04/14/25 1314   Discharge Planning   Living Arrangements Spouse/significant other   Support Systems Spouse/significant other   Assistance Needed ortho consult, ?surgery   Type of Residence Private residence   Number of Stairs to Enter Residence 0   Number of Stairs Within Residence 0   Home or Post Acute Services None   Expected Discharge Disposition Home   Does the patient need discharge transport arranged? No   Financial Resource Strain   How hard is it for you to pay for the very basics like food, housing, medical care, and heating? Not hard   Housing Stability   In the last 12 months, was there a time when you were not able to pay the mortgage or rent on time? N   In the past 12 months, how many times have you moved where you were living? 1   At any time in the past 12 months, were you homeless or living in a shelter (including now)? N   Transportation Needs   In the past 12 months, has lack of transportation kept you from medical appointments or from getting medications? no   In the past 12 months, has lack of transportation kept you from meetings, work, or from getting things needed for daily living? No   Stroke Family Assessment   Stroke Family Assessment Needed No   Intensity of Service   Intensity of Service 0-30 min

## 2025-04-14 NOTE — ED PROVIDER NOTES
Emergency Department Provider Note        History of Present Illness     Chief Complaint: Leg Pain   History provided by: Patient  Limitations to History: None  External Chart Review: See ED Course    HPI:  Kristi Pat is a 64 y.o. female with PMHx of MS presenting to the ED for L leg swelling.     Patient reports that 2 weeks ago she was using her lift at home to get out of a chair when her left leg got caught and twisted.  She had an intense pain above her left knee at the time but this resolved and she has not had any discomfort since.  She no longer ambulates secondary to her MS but has noted left lower extremity edema and bruising that has been worsening.  She was seen in urgent care yesterday and diagnosed with a muscle strain.  A DVT ultrasound was ordered which she had completed today which was positive for acute DVT in the left femoral vein.  She has no prior history of DVT.  She denies chest pain, shortness of breath.    Physical Exam   Triage vitals:  T 36 °C (96.8 °F)  HR 90  /75  RR 18  O2 100 % None (Room air)    Constitutional: Awake, alert, not in acute distress  HENT: Head atraumatic, mucous membranes moist  Eyes:  Conjunctivae normal  Neck:  Supple  Lungs: Clear to auscultation, breath sounds equal and symmetric, no wheezes, rales, or rhonchi  Heart: Regular rate and rhythm, no murmur, rub or gallop  Abdomen: Soft, nontender, nondistended, no rebound or guarding  Extremities:  obvious deformity to L distal thigh.  There is tenderness to the distal L thigh and L knee.  There is diffuse LLE swelling as well as ecchymosis throughout the L thigh, but no erythema, or warmth..  No wounds to LLE. ROM is limited at the hip and knee. DP, PT pulses are 2+ bilaterally. Cap refill brisk. SILT throughout the L lower extremity.  Neuro: Alert  Skin: Warm, dry  Psych: Calm, cooperative       Medical Decision Making & ED Course   Medical Decision Making:  Kristi Pat is a 64 y.o. female with PMHx  as above in HPI who presented to the ED for LLE swelling. Patient was afebrile, hemodynamically stable, and satting on room air on arrival.     Exam as above.    Clinical course as below in ED course:  ED Course as of 04/14/25 2249 Mon Apr 14, 2025   1204 External chart review:  Neuro office visit 4/10/25  Notes hx MS     [SS]   1329 XR femur left 2+ views  I have personally reviewed and interpreted this XR L femur, which shows displaced L distal femur fx. Final decision making pending radiology read.   [SS]   1329 XR femur left 2+ views  Radiology read:  IMPRESSION:  Markedly displaced and angulated distal femoral fracture.      Marked osseous demineralization.      Marked focal lucency distal femur and proximal tibia may be related  to generalized osseous demineralization although underlying  pathologic fracture not reliably excluded. Attention recommended on  follow-up.   [SS]   1329 XR knee left 1-2 views  Radiology read:  IMPRESSION:  Markedly displaced and angulated distal femoral fracture.      Marked osseous demineralization.      Marked focal lucency distal femur and proximal tibia may be related  to generalized osseous demineralization although underlying  pathologic fracture not reliably excluded. Attention recommended on  follow-up.   [SS]   1329 CBC and Auto Differential(!)  Mild anemia, no leukocytosis or thrombocytopenia [SS]   1330 Comprehensive metabolic panel(!)  Mild elevation alk phos and bili, no signs of acute liver injury, no clinically significant electrode abnormalities, no AL [SS]   1330 External chart review:  DVT US today  IMPRESSION:  Nonocclusive deep vein thrombosis involving the mid and distal  portions of the left femoral vein.       [SS]   1330 Personally discussed currently available facts and concerns about the case with ortho, who advises will eval pt   [SS]   1352 XR tibia fibula left 2 views  Radiology read:  IMPRESSION:  Osteopenia/osteoporosis with no fracture or  dislocation of the left  tibia or left fibula.   [SS]   1533 Personally discussed currently available facts and concerns about the case with ortho, who advises rec txfer to [facility] [place]. Likely OR tm. Hold heparin for now   [SS]      ED Course User Index  [SS] Steffen Simerlink, MD         Diagnoses as of 04/14/25 2249   Closed fracture of distal end of left femur, unspecified fracture morphology, initial encounter   Acute deep vein thrombosis (DVT) of femoral vein of left lower extremity       A/P:    XR imaging shows displaced L distal femur fx. Ortho consulted, rec txfer to [facility] [place] for likely surgical mgmt tomorrow AM. She was placed in a knee immobilizer. Additionally ortho rec holding DVT tx currently given upcoming surgery. Pt accepted to [facility] [place] currently pending transport.     ------------------------------------------------  Independent Test Interpretation: See ED Course  Personal Discussion with Other Providers: See ED Course    Disposition   As a result of their workup, the patient will require transfer to another facility.  The patient and/or her guardian/representative is agreeable to transfer at this time.   We will continue to monitor and manage the patient in the Emergency Department until transport for transfer can be arranged.    Signed out to oncoming provider pending transport.     Procedures   Procedures    Steffen Simerlink, MD Steffen Simerlink, MD  04/14/25 6845

## 2025-04-14 NOTE — ED TRIAGE NOTES
Patient c/o left leg injury 2 weeks ago. Pt states she twisted her left knee while using her lift at home to get herself out of the chair. Pt c/o left knee pain and swelling. Pt has a hx of MS

## 2025-04-14 NOTE — PROGRESS NOTES
Pharmacy Medication History     Source of Information: Per patient     Additional concerns with the patient's PTA list.   N/a    Notified Provider via Haiku : No    The following updates were made to the Prior to Admission medication list:     Medications ADDED:   Levofloxacin ( has 5 days left )   Medications CHANGED:  Baclofen. Now does 20mg morning, 20mg in the afternoon and 10mg in the evening   Medications REMOVED:   Multivitamin   Medications NOT TAKING:   N/a    Allergy reviewed : Yes    Meds 2 Beds : No    Outpatient pharmacy confirmed and updated in chart : Yes    Pharmacy name: [facility]     The list below reflectives the updated PTA list. Please review each medication in order reconciliation for additional clarification and justification.    Prior to Admission Medications   Prescriptions Last Dose   aspirin 81 mg EC tablet 4/14/2025 Morning   Sig: Take 1 tablet (81 mg) by mouth once daily.   atorvastatin (Lipitor) 40 mg tablet 4/14/2025 Morning   Sig: Take 1 tablet (40 mg) by mouth once daily.   baclofen (Lioresal) 10 mg tablet 4/14/2025 Morning   Sig: Take 1 tablet (10 mg) by mouth 4 times a day.   Patient taking differently: Take 1-2 tablets (10-20 mg) by mouth 3 times a day. Taking 20mg morning and afternoon and 10mg in the evening   levoFLOXacin (Levaquin) 500 mg tablet 4/13/2025   Sig: Take 1 tablet (500 mg) by mouth once daily. For 10 days ( as of 4/14/25 5 days left to go )   methotrexate (Trexall) 2.5 mg tablet 4/12/2025   Sig: TAKE 6 TABLETS ONCE EVERY WEEK. FOLLOW DIRECTIONS CAREFULLY AND ASK TO EXPLAIN ANY PART YOU DO NOT UNDERSTAND. TAKE EXACTLY AS DIRECTED.   Patient taking differently: Take 6 tablets (15 mg total) by mouth 1 (one) time per week.  Saturday          nitrofurantoin, macrocrystal-monohydrate, (Macrobid) 100 mg capsule 4/14/2025   Sig: Take 1 capsule (100 mg) by mouth once daily.      Facility-Administered Medications: None       The list below reflectives the updated  allergy list. Please review each documented allergy for additional clarification and justification.    Allergies   Allergen Reactions    Penicillins Hives     hives    Tolterodine Unknown     Urinary retention          04/14/25 at 3:11 PM - Morenita Murphy

## 2025-04-14 NOTE — CONSULTS
Reason For Consult  Subacute left distal femur fracture    History Of Present Illness  Kristi Pat is a 64 y.o. female with a past medical history of multiple sclerosis on methotrexate and baclofen.  Diagnosed in 1985.  Ambulatory with a walker up until 5 years ago at which point she has been nonambulatory.  She does stand assisted for transfers.    Has had decubitus ulcers in the past but now has 1 on the right buttock.  Also with recurrent UTIs on suppressive nitrofurantoin.    Presents 2 weeks after standing for transfer into her shower when she twisted her knee.  Noticed immediate significant pain and swelling.  Swelling remained but pain subsided significantly.  Went to urgent care yesterday at which point DVT ultrasound was positive for nonocclusive femoral vein thrombosis.    Presents to [facility] ED today with x-rays showing distal femur fracture.       Past Medical History  She has a past medical history of Encounter for screening for malignant neoplasm of cervix (09/18/2017) and Encounter for screening mammogram for malignant neoplasm of breast (11/22/2022).    Surgical History  She has a past surgical history that includes MR angio head wo IV contrast (11/12/2022) and MR angio neck wo IV contrast (11/12/2022).     Social History  She reports that she quit smoking about 40 years ago. Her smoking use included cigarettes. She started smoking about 45 years ago. She has a 1.3 pack-year smoking history. She has never used smokeless tobacco. She reports that she does not currently use alcohol. She reports that she does not use drugs.    Family History  Family History   Problem Relation Name Age of Onset    Thyroid disease Mother      Arthritis Mother      Diabetes Mother      Heart disease Father      Gout Father      Gout Brother      Breast cancer Maternal Grandmother      Breast cancer Paternal Grandmother          Allergies  Penicillins and Tolterodine    Review of Systems  12 point reviewed and negative  "except as per above     Physical Exam  Physical Exam    - Constitutional: No acute distress, cooperative  - Eyes: EOM grossly intact  - Head/Neck: Trachea midline  - Respiratory/Thorax: NWOB  - Cardiovascular: RRR on peripheral palpation  - Gastrointestinal: Nondistended  - Psychological: Appropriate mood/behavior  - Skin: Warm and dry. Additional findings in musculoskeletal evaluation  - Musculoskeletal:  ---    Left lower extremity:  - closed injury or proximal fracture fragment putting significant pressure on the skin  - Marked edema throughout the lower extremity  - Fires TA/GS/EHL  - Sensation diminished in Parker/Sa/SP/DP/T distribution  - foot warm and well-perfused    Right lower extremity with right buttock decubitus ulcer     Last Recorded Vitals  Blood pressure 119/71, pulse 73, temperature 36 °C (96.8 °F), temperature source Temporal, resp. rate 18, height 1.753 m (5' 9\"), weight 54.4 kg (120 lb), SpO2 100%.    Relevant Results      Scheduled medications    Continuous medications    PRN medications    Results for orders placed or performed during the hospital encounter of 04/14/25 (from the past 24 hours)   CBC and Auto Differential   Result Value Ref Range    WBC 5.1 4.4 - 11.3 x10*3/uL    nRBC 0.0 0.0 - 0.0 /100 WBCs    RBC 3.36 (L) 4.00 - 5.20 x10*6/uL    Hemoglobin 10.2 (L) 12.0 - 16.0 g/dL    Hematocrit 33.6 (L) 36.0 - 46.0 %     80 - 100 fL    MCH 30.4 26.0 - 34.0 pg    MCHC 30.4 (L) 32.0 - 36.0 g/dL    RDW 17.5 (H) 11.5 - 14.5 %    Platelets 215 150 - 450 x10*3/uL    Neutrophils % 75.3 40.0 - 80.0 %    Immature Granulocytes %, Automated 0.2 0.0 - 0.9 %    Lymphocytes % 17.0 13.0 - 44.0 %    Monocytes % 6.1 2.0 - 10.0 %    Eosinophils % 1.0 0.0 - 6.0 %    Basophils % 0.4 0.0 - 2.0 %    Neutrophils Absolute 3.81 1.20 - 7.70 x10*3/uL    Immature Granulocytes Absolute, Automated 0.01 0.00 - 0.70 x10*3/uL    Lymphocytes Absolute 0.86 (L) 1.20 - 4.80 x10*3/uL    Monocytes Absolute 0.31 0.10 - 1.00 " x10*3/uL    Eosinophils Absolute 0.05 0.00 - 0.70 x10*3/uL    Basophils Absolute 0.02 0.00 - 0.10 x10*3/uL   Comprehensive metabolic panel   Result Value Ref Range    Glucose 83 74 - 99 mg/dL    Sodium 141 136 - 145 mmol/L    Potassium 4.3 3.5 - 5.3 mmol/L    Chloride 109 (H) 98 - 107 mmol/L    Bicarbonate 27 21 - 32 mmol/L    Anion Gap 9 (L) 10 - 20 mmol/L    Urea Nitrogen 19 6 - 23 mg/dL    Creatinine 0.74 0.50 - 1.05 mg/dL    eGFR >90 >60 mL/min/1.73m*2    Calcium 8.9 8.6 - 10.3 mg/dL    Albumin 3.5 3.4 - 5.0 g/dL    Alkaline Phosphatase 164 (H) 33 - 136 U/L    Total Protein 6.0 (L) 6.4 - 8.2 g/dL    AST 34 9 - 39 U/L    Bilirubin, Total 1.5 (H) 0.0 - 1.2 mg/dL    ALT 19 7 - 45 U/L   Coagulation Screen   Result Value Ref Range    Protime 12.3 9.8 - 12.4 seconds    INR 1.1 0.9 - 1.1    aPTT 29 26 - 36 seconds   Type and Screen   Result Value Ref Range    ABO TYPE B     Rh TYPE POS     ANTIBODY SCREEN NEG      *Note: Due to a large number of results and/or encounters for the requested time period, some results have not been displayed. A complete set of results can be found in Results Review.        Assessment/Plan     64-year-old female nonambulatory due to multiple sclerosis over the past 5 years.  Does stand assisted for pivot transfers.  [facility] with  [facility]  Presenting 2 weeks after minimal traumatic event involving twisting her left knee.  Patient notes that this has always been her weaker side in her motion prior to injury was flexing only to about 30 degrees.      Did not have imaging until today at which point subacute distal femur fracture identified.  Notably went to urgent care yesterday at which point ultrasound positive for nonocclusive left femoral vein DVT.    Plan:  - Recommending transfer to [facility] for further operative management  -Recommend medicine/vascular input regarding nonocclusive femoral vein DVT  - Placed in a well-padded knee immobilizer  - Nonweightbearing left  lower extremity  -N.p.o. after midnight 4/15        D/w Dr. Deonte Fontana MD

## 2025-04-14 NOTE — PROGRESS NOTES
04/14/25 1313   ACS Disability Status   Are you deaf or do you have serious difficulty hearing? N   Are you blind or do you have serious difficulty seeing, even when wearing glasses? N   Because of a physical, mental, or emotional condition, do you have serious difficulty concentrating, remembering, or making decisions? (5 years old or older) Y  (multiple sclerosis)   Do you have serious difficulty walking or climbing stairs? Y  (hx of falls, multiple sclerosis, left femur fracture, uses scooter)   Do you have serious difficulty dressing or bathing? Y   Because of a physical, mental, or emotional condition, do you have serious difficulty doing errands alone such as visiting the doctor? Y

## 2025-04-15 ENCOUNTER — APPOINTMENT (OUTPATIENT)
Dept: RADIOLOGY | Facility: HOSPITAL | Age: 65
End: 2025-04-15
Payer: COMMERCIAL

## 2025-04-15 ENCOUNTER — HOSPITAL ENCOUNTER (INPATIENT)
Facility: HOSPITAL | Age: 65
LOS: 2 days | Discharge: HOME | End: 2025-04-17
Attending: EMERGENCY MEDICINE | Admitting: SURGERY
Payer: COMMERCIAL

## 2025-04-15 ENCOUNTER — ANESTHESIA EVENT (OUTPATIENT)
Dept: OPERATING ROOM | Facility: HOSPITAL | Age: 65
End: 2025-04-15
Payer: COMMERCIAL

## 2025-04-15 ENCOUNTER — CLINICAL SUPPORT (OUTPATIENT)
Dept: EMERGENCY MEDICINE | Facility: HOSPITAL | Age: 65
End: 2025-04-15
Payer: COMMERCIAL

## 2025-04-15 ENCOUNTER — ANESTHESIA (OUTPATIENT)
Dept: OPERATING ROOM | Facility: HOSPITAL | Age: 65
End: 2025-04-15
Payer: COMMERCIAL

## 2025-04-15 DIAGNOSIS — S72.432A CLOSED BICONDYLAR FRACTURE OF DISTAL FEMUR, LEFT, INITIAL ENCOUNTER: Primary | ICD-10-CM

## 2025-04-15 DIAGNOSIS — S72.422A CLOSED BICONDYLAR FRACTURE OF DISTAL FEMUR, LEFT, INITIAL ENCOUNTER: Primary | ICD-10-CM

## 2025-04-15 DIAGNOSIS — I82.412 ACUTE DEEP VEIN THROMBOSIS (DVT) OF FEMORAL VEIN OF LEFT LOWER EXTREMITY: ICD-10-CM

## 2025-04-15 DIAGNOSIS — S72.402A CLOSED FRACTURE OF DISTAL END OF LEFT FEMUR, UNSPECIFIED FRACTURE MORPHOLOGY, INITIAL ENCOUNTER: ICD-10-CM

## 2025-04-15 LAB
25(OH)D3+25(OH)D2 SERPL-MCNC: 57 NG/ML (ref 30–100)
ABO GROUP (TYPE) IN BLOOD: NORMAL
ALBUMIN SERPL-MCNC: 3.8 G/DL (ref 3.6–5.1)
ALP SERPL-CCNC: 151 U/L (ref 37–153)
ALT SERPL-CCNC: 18 U/L (ref 6–29)
ANION GAP SERPL CALCULATED.4IONS-SCNC: 7 MMOL/L (CALC) (ref 7–17)
ANTIBODY SCREEN: NORMAL
APTT PPP: 32 SECONDS (ref 26–36)
AST SERPL-CCNC: 30 U/L (ref 10–35)
ATRIAL RATE: 76 BPM
BASOPHILS # BLD AUTO: 20 CELLS/UL (ref 0–200)
BASOPHILS NFR BLD AUTO: 0.4 %
BILIRUB SERPL-MCNC: 1.2 MG/DL (ref 0.2–1.2)
BUN SERPL-MCNC: 19 MG/DL (ref 7–25)
CALCIUM SERPL-MCNC: 8.8 MG/DL (ref 8.6–10.4)
CHLORIDE SERPL-SCNC: 108 MMOL/L (ref 98–110)
CHOLEST SERPL-MCNC: 100 MG/DL
CHOLEST/HDLC SERPL: 2.8 (CALC)
CO2 SERPL-SCNC: 27 MMOL/L (ref 20–32)
CREAT SERPL-MCNC: 0.77 MG/DL (ref 0.5–1.05)
EGFRCR SERPLBLD CKD-EPI 2021: 87 ML/MIN/1.73M2
EOSINOPHIL # BLD AUTO: 50 CELLS/UL (ref 15–500)
EOSINOPHIL NFR BLD AUTO: 1 %
ERYTHROCYTE [DISTWIDTH] IN BLOOD BY AUTOMATED COUNT: 15.7 % (ref 11–15)
ERYTHROCYTE [DISTWIDTH] IN BLOOD BY AUTOMATED COUNT: 15.9 % (ref 11–15)
ERYTHROCYTE [DISTWIDTH] IN BLOOD BY AUTOMATED COUNT: 16.9 % (ref 11.5–14.5)
EST. AVERAGE GLUCOSE BLD GHB EST-MCNC: 85 MG/DL
EST. AVERAGE GLUCOSE BLD GHB EST-SCNC: 4.7 MMOL/L
GLUCOSE SERPL-MCNC: 79 MG/DL (ref 65–99)
HBA1C MFR BLD: 4.6 %
HCT VFR BLD AUTO: 27.4 % (ref 36–46)
HCT VFR BLD AUTO: 33.9 % (ref 35–45)
HCT VFR BLD AUTO: 34.2 % (ref 35–45)
HDLC SERPL-MCNC: 36 MG/DL
HGB BLD-MCNC: 10.3 G/DL (ref 11.7–15.5)
HGB BLD-MCNC: 10.4 G/DL (ref 11.7–15.5)
HGB BLD-MCNC: 8 G/DL (ref 12–16)
LDLC SERPL CALC-MCNC: 49 MG/DL (CALC)
LYMPHOCYTES # BLD AUTO: 755 CELLS/UL (ref 850–3900)
LYMPHOCYTES NFR BLD AUTO: 15.1 %
MCH RBC QN AUTO: 30 PG (ref 27–33)
MCH RBC QN AUTO: 30 PG (ref 27–33)
MCH RBC QN AUTO: 30.3 PG (ref 26–34)
MCHC RBC AUTO-ENTMCNC: 29.2 G/DL (ref 32–36)
MCHC RBC AUTO-ENTMCNC: 30.4 G/DL (ref 32–36)
MCHC RBC AUTO-ENTMCNC: 30.4 G/DL (ref 32–36)
MCV RBC AUTO: 104 FL (ref 80–100)
MCV RBC AUTO: 98.6 FL (ref 80–100)
MCV RBC AUTO: 98.8 FL (ref 80–100)
MONOCYTES # BLD AUTO: 260 CELLS/UL (ref 200–950)
MONOCYTES NFR BLD AUTO: 5.2 %
NEUTROPHILS # BLD AUTO: 3915 CELLS/UL (ref 1500–7800)
NEUTROPHILS NFR BLD AUTO: 78.3 %
NONHDLC SERPL-MCNC: 64 MG/DL (CALC)
NRBC BLD-RTO: 0 /100 WBCS (ref 0–0)
P AXIS: 76 DEGREES
P OFFSET: 200 MS
P ONSET: 162 MS
PLATELET # BLD AUTO: 172 X10*3/UL (ref 150–450)
PLATELET # BLD AUTO: 266 THOUSAND/UL (ref 140–400)
PLATELET # BLD AUTO: 267 THOUSAND/UL (ref 140–400)
PMV BLD REES-ECKER: 12.4 FL (ref 7.5–12.5)
PMV BLD REES-ECKER: 12.5 FL (ref 7.5–12.5)
POTASSIUM SERPL-SCNC: 4.1 MMOL/L (ref 3.5–5.3)
PR INTERVAL: 126 MS
PROT SERPL-MCNC: 6 G/DL (ref 6.1–8.1)
Q ONSET: 225 MS
QRS COUNT: 12 BEATS
QRS DURATION: 62 MS
QT INTERVAL: 386 MS
QTC CALCULATION(BAZETT): 434 MS
QTC FREDERICIA: 417 MS
R AXIS: 57 DEGREES
RBC # BLD AUTO: 2.64 X10*6/UL (ref 4–5.2)
RBC # BLD AUTO: 3.43 MILLION/UL (ref 3.8–5.1)
RBC # BLD AUTO: 3.47 MILLION/UL (ref 3.8–5.1)
RH FACTOR (ANTIGEN D): NORMAL
SODIUM SERPL-SCNC: 142 MMOL/L (ref 135–146)
T AXIS: 71 DEGREES
T OFFSET: 418 MS
TRIGL SERPL-MCNC: 72 MG/DL
TSH SERPL-ACNC: 2.2 MIU/L (ref 0.4–4.5)
VENTRICULAR RATE: 76 BPM
WBC # BLD AUTO: 4.9 THOUSAND/UL (ref 3.8–10.8)
WBC # BLD AUTO: 5 THOUSAND/UL (ref 3.8–10.8)
WBC # BLD AUTO: 6.5 X10*3/UL (ref 4.4–11.3)

## 2025-04-15 PROCEDURE — P9045 ALBUMIN (HUMAN), 5%, 250 ML: HCPCS | Mod: JZ | Performed by: ANESTHESIOLOGY

## 2025-04-15 PROCEDURE — 2500000002 HC RX 250 W HCPCS SELF ADMINISTERED DRUGS (ALT 637 FOR MEDICARE OP, ALT 636 FOR OP/ED)

## 2025-04-15 PROCEDURE — 3600000004 HC OR TIME - INITIAL BASE CHARGE - PROCEDURE LEVEL FOUR: Performed by: ORTHOPAEDIC SURGERY

## 2025-04-15 PROCEDURE — 7100000002 HC RECOVERY ROOM TIME - EACH INCREMENTAL 1 MINUTE: Performed by: ORTHOPAEDIC SURGERY

## 2025-04-15 PROCEDURE — 2500000001 HC RX 250 WO HCPCS SELF ADMINISTERED DRUGS (ALT 637 FOR MEDICARE OP): Performed by: EMERGENCY MEDICINE

## 2025-04-15 PROCEDURE — 2500000001 HC RX 250 WO HCPCS SELF ADMINISTERED DRUGS (ALT 637 FOR MEDICARE OP)

## 2025-04-15 PROCEDURE — 71045 X-RAY EXAM CHEST 1 VIEW: CPT | Performed by: RADIOLOGY

## 2025-04-15 PROCEDURE — C1713 ANCHOR/SCREW BN/BN,TIS/BN: HCPCS | Performed by: ORTHOPAEDIC SURGERY

## 2025-04-15 PROCEDURE — 99285 EMERGENCY DEPT VISIT HI MDM: CPT | Performed by: EMERGENCY MEDICINE

## 2025-04-15 PROCEDURE — 2500000005 HC RX 250 GENERAL PHARMACY W/O HCPCS: Performed by: ORTHOPAEDIC SURGERY

## 2025-04-15 PROCEDURE — A27506 PR OPEN RX FEMUR FX+INTRAMED ROD

## 2025-04-15 PROCEDURE — 3600000009 HC OR TIME - EACH INCREMENTAL 1 MINUTE - PROCEDURE LEVEL FOUR: Performed by: ORTHOPAEDIC SURGERY

## 2025-04-15 PROCEDURE — 27506 TREATMENT OF THIGH FRACTURE: CPT | Performed by: ORTHOPAEDIC SURGERY

## 2025-04-15 PROCEDURE — 2500000004 HC RX 250 GENERAL PHARMACY W/ HCPCS (ALT 636 FOR OP/ED)

## 2025-04-15 PROCEDURE — 86900 BLOOD TYPING SEROLOGIC ABO: CPT

## 2025-04-15 PROCEDURE — 85027 COMPLETE CBC AUTOMATED: CPT | Mod: PARLAB

## 2025-04-15 PROCEDURE — 2500000004 HC RX 250 GENERAL PHARMACY W/ HCPCS (ALT 636 FOR OP/ED): Performed by: ORTHOPAEDIC SURGERY

## 2025-04-15 PROCEDURE — 86923 COMPATIBILITY TEST ELECTRIC: CPT

## 2025-04-15 PROCEDURE — 85730 THROMBOPLASTIN TIME PARTIAL: CPT | Mod: PARLAB | Performed by: SURGERY

## 2025-04-15 PROCEDURE — 93005 ELECTROCARDIOGRAM TRACING: CPT

## 2025-04-15 PROCEDURE — 36415 COLL VENOUS BLD VENIPUNCTURE: CPT

## 2025-04-15 PROCEDURE — G0390 TRAUMA RESPONS W/HOSP CRITI: HCPCS

## 2025-04-15 PROCEDURE — 1100000001 HC PRIVATE ROOM DAILY

## 2025-04-15 PROCEDURE — 99222 1ST HOSP IP/OBS MODERATE 55: CPT | Performed by: ORTHOPAEDIC SURGERY

## 2025-04-15 PROCEDURE — 0QSC06Z REPOSITION LEFT LOWER FEMUR WITH INTRAMEDULLARY INTERNAL FIXATION DEVICE, OPEN APPROACH: ICD-10-PCS | Performed by: STUDENT IN AN ORGANIZED HEALTH CARE EDUCATION/TRAINING PROGRAM

## 2025-04-15 PROCEDURE — 7100000001 HC RECOVERY ROOM TIME - INITIAL BASE CHARGE: Performed by: ORTHOPAEDIC SURGERY

## 2025-04-15 PROCEDURE — C1769 GUIDE WIRE: HCPCS | Performed by: ORTHOPAEDIC SURGERY

## 2025-04-15 PROCEDURE — 2500000004 HC RX 250 GENERAL PHARMACY W/ HCPCS (ALT 636 FOR OP/ED): Mod: JZ | Performed by: ANESTHESIOLOGY

## 2025-04-15 PROCEDURE — 3700000001 HC GENERAL ANESTHESIA TIME - INITIAL BASE CHARGE: Performed by: ORTHOPAEDIC SURGERY

## 2025-04-15 PROCEDURE — 71045 X-RAY EXAM CHEST 1 VIEW: CPT

## 2025-04-15 PROCEDURE — P9045 ALBUMIN (HUMAN), 5%, 250 ML: HCPCS | Mod: JZ

## 2025-04-15 PROCEDURE — A27506 PR OPEN RX FEMUR FX+INTRAMED ROD: Performed by: ANESTHESIOLOGY

## 2025-04-15 PROCEDURE — 3700000002 HC GENERAL ANESTHESIA TIME - EACH INCREMENTAL 1 MINUTE: Performed by: ORTHOPAEDIC SURGERY

## 2025-04-15 PROCEDURE — 2780000003 HC OR 278 NO HCPCS: Performed by: ORTHOPAEDIC SURGERY

## 2025-04-15 PROCEDURE — 2720000007 HC OR 272 NO HCPCS: Performed by: ORTHOPAEDIC SURGERY

## 2025-04-15 PROCEDURE — 86901 BLOOD TYPING SEROLOGIC RH(D): CPT

## 2025-04-15 DEVICE — GUIDE WIRE, BALL-TIPPED, STERILE: Type: IMPLANTABLE DEVICE | Site: FEMUR | Status: FUNCTIONAL

## 2025-04-15 DEVICE — ADVANCED LOCKING SCREW: Type: IMPLANTABLE DEVICE | Site: FEMUR | Status: FUNCTIONAL

## 2025-04-15 DEVICE — IMPLANTABLE DEVICE
Type: IMPLANTABLE DEVICE | Site: FEMUR | Status: FUNCTIONAL
Brand: T2

## 2025-04-15 DEVICE — END CAP, SCN
Type: IMPLANTABLE DEVICE | Site: FEMUR | Status: FUNCTIONAL
Brand: T2

## 2025-04-15 DEVICE — LOCKING SCREW
Type: IMPLANTABLE DEVICE | Site: FEMUR | Status: FUNCTIONAL
Brand: T2 ALPHA

## 2025-04-15 DEVICE — K-WIRE, STERILE: Type: IMPLANTABLE DEVICE | Site: FEMUR | Status: FUNCTIONAL

## 2025-04-15 RX ORDER — CEFAZOLIN SODIUM 2 G/100ML
2 INJECTION, SOLUTION INTRAVENOUS EVERY 8 HOURS
Status: COMPLETED | OUTPATIENT
Start: 2025-04-15 | End: 2025-04-16

## 2025-04-15 RX ORDER — HYDROMORPHONE HYDROCHLORIDE 0.2 MG/ML
0.2 INJECTION INTRAMUSCULAR; INTRAVENOUS; SUBCUTANEOUS EVERY 5 MIN PRN
Status: DISCONTINUED | OUTPATIENT
Start: 2025-04-15 | End: 2025-04-15 | Stop reason: HOSPADM

## 2025-04-15 RX ORDER — ASPIRIN 81 MG/1
81 TABLET ORAL DAILY
Status: DISCONTINUED | OUTPATIENT
Start: 2025-04-15 | End: 2025-04-17 | Stop reason: HOSPADM

## 2025-04-15 RX ORDER — ATORVASTATIN CALCIUM 40 MG/1
40 TABLET, FILM COATED ORAL DAILY
Status: DISCONTINUED | OUTPATIENT
Start: 2025-04-15 | End: 2025-04-17 | Stop reason: HOSPADM

## 2025-04-15 RX ORDER — TRANEXAMIC ACID 10 MG/ML
INJECTION, SOLUTION INTRAVENOUS AS NEEDED
Status: DISCONTINUED | OUTPATIENT
Start: 2025-04-15 | End: 2025-04-15

## 2025-04-15 RX ORDER — ONDANSETRON HYDROCHLORIDE 2 MG/ML
INJECTION, SOLUTION INTRAVENOUS AS NEEDED
Status: DISCONTINUED | OUTPATIENT
Start: 2025-04-15 | End: 2025-04-15

## 2025-04-15 RX ORDER — SODIUM CHLORIDE 0.9 G/100ML
INJECTION, SOLUTION IRRIGATION AS NEEDED
Status: DISCONTINUED | OUTPATIENT
Start: 2025-04-15 | End: 2025-04-15 | Stop reason: HOSPADM

## 2025-04-15 RX ORDER — TOBRAMYCIN 1.2 G/30ML
INJECTION, POWDER, LYOPHILIZED, FOR SOLUTION INTRAVENOUS AS NEEDED
Status: DISCONTINUED | OUTPATIENT
Start: 2025-04-15 | End: 2025-04-15 | Stop reason: HOSPADM

## 2025-04-15 RX ORDER — OXYCODONE HYDROCHLORIDE 5 MG/1
5 TABLET ORAL EVERY 6 HOURS PRN
Status: DISCONTINUED | OUTPATIENT
Start: 2025-04-15 | End: 2025-04-17 | Stop reason: HOSPADM

## 2025-04-15 RX ORDER — ALBUMIN HUMAN 50 G/1000ML
12.5 SOLUTION INTRAVENOUS ONCE
Status: COMPLETED | OUTPATIENT
Start: 2025-04-15 | End: 2025-04-15

## 2025-04-15 RX ORDER — BACLOFEN 10 MG/1
10 TABLET ORAL ONCE
Status: COMPLETED | OUTPATIENT
Start: 2025-04-15 | End: 2025-04-15

## 2025-04-15 RX ORDER — OXYCODONE HYDROCHLORIDE 5 MG/1
10 TABLET ORAL EVERY 4 HOURS PRN
Status: DISCONTINUED | OUTPATIENT
Start: 2025-04-15 | End: 2025-04-17 | Stop reason: HOSPADM

## 2025-04-15 RX ORDER — NITROFURANTOIN 25; 75 MG/1; MG/1
100 CAPSULE ORAL DAILY
Status: DISCONTINUED | OUTPATIENT
Start: 2025-04-15 | End: 2025-04-17 | Stop reason: HOSPADM

## 2025-04-15 RX ORDER — GLYCOPYRROLATE 0.2 MG/ML
INJECTION INTRAMUSCULAR; INTRAVENOUS AS NEEDED
Status: DISCONTINUED | OUTPATIENT
Start: 2025-04-15 | End: 2025-04-15

## 2025-04-15 RX ORDER — FENTANYL CITRATE 50 UG/ML
INJECTION, SOLUTION INTRAMUSCULAR; INTRAVENOUS AS NEEDED
Status: DISCONTINUED | OUTPATIENT
Start: 2025-04-15 | End: 2025-04-15

## 2025-04-15 RX ORDER — HEPARIN SODIUM 10000 [USP'U]/100ML
0-4500 INJECTION, SOLUTION INTRAVENOUS CONTINUOUS
Status: DISCONTINUED | OUTPATIENT
Start: 2025-04-15 | End: 2025-04-17 | Stop reason: HOSPADM

## 2025-04-15 RX ORDER — LIDOCAINE HYDROCHLORIDE 10 MG/ML
0.1 INJECTION, SOLUTION INFILTRATION; PERINEURAL ONCE
Status: DISCONTINUED | OUTPATIENT
Start: 2025-04-15 | End: 2025-04-15 | Stop reason: HOSPADM

## 2025-04-15 RX ORDER — HYDROMORPHONE HYDROCHLORIDE 1 MG/ML
INJECTION, SOLUTION INTRAMUSCULAR; INTRAVENOUS; SUBCUTANEOUS AS NEEDED
Status: DISCONTINUED | OUTPATIENT
Start: 2025-04-15 | End: 2025-04-15

## 2025-04-15 RX ORDER — PROPOFOL 10 MG/ML
INJECTION, EMULSION INTRAVENOUS AS NEEDED
Status: DISCONTINUED | OUTPATIENT
Start: 2025-04-15 | End: 2025-04-15

## 2025-04-15 RX ORDER — MIDAZOLAM HYDROCHLORIDE 1 MG/ML
INJECTION INTRAMUSCULAR; INTRAVENOUS AS NEEDED
Status: DISCONTINUED | OUTPATIENT
Start: 2025-04-15 | End: 2025-04-15

## 2025-04-15 RX ORDER — BACLOFEN 10 MG/1
10 TABLET ORAL 4 TIMES DAILY
Status: DISCONTINUED | OUTPATIENT
Start: 2025-04-15 | End: 2025-04-17 | Stop reason: HOSPADM

## 2025-04-15 RX ORDER — ROCURONIUM BROMIDE 10 MG/ML
INJECTION, SOLUTION INTRAVENOUS AS NEEDED
Status: DISCONTINUED | OUTPATIENT
Start: 2025-04-15 | End: 2025-04-15

## 2025-04-15 RX ORDER — LIDOCAINE HYDROCHLORIDE 20 MG/ML
INJECTION, SOLUTION INFILTRATION; PERINEURAL AS NEEDED
Status: DISCONTINUED | OUTPATIENT
Start: 2025-04-15 | End: 2025-04-15

## 2025-04-15 RX ORDER — LIDOCAINE HYDROCHLORIDE AND EPINEPHRINE 10; 10 UG/ML; MG/ML
20 INJECTION, SOLUTION INFILTRATION; PERINEURAL ONCE
Status: COMPLETED | OUTPATIENT
Start: 2025-04-15 | End: 2025-04-15

## 2025-04-15 RX ORDER — PHENYLEPHRINE HCL IN 0.9% NACL 0.4MG/10ML
SYRINGE (ML) INTRAVENOUS AS NEEDED
Status: DISCONTINUED | OUTPATIENT
Start: 2025-04-15 | End: 2025-04-15

## 2025-04-15 RX ORDER — VANCOMYCIN HYDROCHLORIDE 1 G/20ML
INJECTION, POWDER, LYOPHILIZED, FOR SOLUTION INTRAVENOUS AS NEEDED
Status: DISCONTINUED | OUTPATIENT
Start: 2025-04-15 | End: 2025-04-15 | Stop reason: HOSPADM

## 2025-04-15 RX ORDER — CEFAZOLIN 1 G/1
INJECTION, POWDER, FOR SOLUTION INTRAVENOUS AS NEEDED
Status: DISCONTINUED | OUTPATIENT
Start: 2025-04-15 | End: 2025-04-15

## 2025-04-15 RX ORDER — METHOTREXATE 2.5 MG/1
15 TABLET ORAL WEEKLY
Status: DISCONTINUED | OUTPATIENT
Start: 2025-04-19 | End: 2025-04-17 | Stop reason: HOSPADM

## 2025-04-15 RX ORDER — ALBUMIN HUMAN 50 G/1000ML
SOLUTION INTRAVENOUS AS NEEDED
Status: DISCONTINUED | OUTPATIENT
Start: 2025-04-15 | End: 2025-04-15

## 2025-04-15 RX ORDER — POLYETHYLENE GLYCOL 3350 17 G/17G
17 POWDER, FOR SOLUTION ORAL DAILY
Status: DISCONTINUED | OUTPATIENT
Start: 2025-04-15 | End: 2025-04-17 | Stop reason: HOSPADM

## 2025-04-15 RX ORDER — SODIUM CHLORIDE, SODIUM LACTATE, POTASSIUM CHLORIDE, CALCIUM CHLORIDE 600; 310; 30; 20 MG/100ML; MG/100ML; MG/100ML; MG/100ML
100 INJECTION, SOLUTION INTRAVENOUS CONTINUOUS
Status: DISCONTINUED | OUTPATIENT
Start: 2025-04-15 | End: 2025-04-15 | Stop reason: HOSPADM

## 2025-04-15 RX ORDER — PNV NO.95/FERROUS FUM/FOLIC AC 28MG-0.8MG
1 TABLET ORAL 2 TIMES DAILY
Qty: 60 TABLET | Refills: 0 | Status: SHIPPED | OUTPATIENT
Start: 2025-04-15 | End: 2025-05-15

## 2025-04-15 RX ADMIN — NITROFURANTOIN MONOHYDRATE/MACROCRYSTALS 100 MG: 25; 75 CAPSULE ORAL at 20:18

## 2025-04-15 RX ADMIN — SUGAMMADEX 200 MG: 100 INJECTION, SOLUTION INTRAVENOUS at 10:06

## 2025-04-15 RX ADMIN — FENTANYL CITRATE 100 MCG: 50 INJECTION, SOLUTION INTRAMUSCULAR; INTRAVENOUS at 07:32

## 2025-04-15 RX ADMIN — Medication 160 MCG: at 07:38

## 2025-04-15 RX ADMIN — Medication 40 MCG: at 08:55

## 2025-04-15 RX ADMIN — Medication 80 MCG: at 08:32

## 2025-04-15 RX ADMIN — LIDOCAINE HYDROCHLORIDE AND EPINEPHRINE 20 ML: 10; 10 INJECTION, SOLUTION INFILTRATION; PERINEURAL at 04:14

## 2025-04-15 RX ADMIN — Medication 120 MCG: at 08:02

## 2025-04-15 RX ADMIN — LIDOCAINE HYDROCHLORIDE 80 MG: 20 INJECTION, SOLUTION INFILTRATION; PERINEURAL at 07:32

## 2025-04-15 RX ADMIN — MIDAZOLAM HYDROCHLORIDE 2 MG: 2 INJECTION, SOLUTION INTRAMUSCULAR; INTRAVENOUS at 07:21

## 2025-04-15 RX ADMIN — ALBUMIN HUMAN 250 ML: 0.05 INJECTION, SOLUTION INTRAVENOUS at 08:48

## 2025-04-15 RX ADMIN — ROCURONIUM BROMIDE 60 MG: 10 INJECTION INTRAVENOUS at 07:33

## 2025-04-15 RX ADMIN — BACLOFEN 10 MG: 10 TABLET ORAL at 20:18

## 2025-04-15 RX ADMIN — Medication 120 MCG: at 08:10

## 2025-04-15 RX ADMIN — SODIUM CHLORIDE, SODIUM LACTATE, POTASSIUM CHLORIDE, AND CALCIUM CHLORIDE: 600; 310; 30; 20 INJECTION, SOLUTION INTRAVENOUS at 07:19

## 2025-04-15 RX ADMIN — Medication 120 MCG: at 08:20

## 2025-04-15 RX ADMIN — HEPARIN SODIUM 1000 UNITS/HR: 10000 INJECTION, SOLUTION INTRAVENOUS at 19:33

## 2025-04-15 RX ADMIN — Medication 80 MCG: at 09:03

## 2025-04-15 RX ADMIN — ONDANSETRON 4 MG: 2 INJECTION INTRAMUSCULAR; INTRAVENOUS at 09:54

## 2025-04-15 RX ADMIN — PROPOFOL 100 MG: 10 INJECTION, EMULSION INTRAVENOUS at 07:32

## 2025-04-15 RX ADMIN — ASPIRIN 81 MG: 81 TABLET, COATED ORAL at 17:24

## 2025-04-15 RX ADMIN — ALBUMIN HUMAN 12.5 G: 0.05 INJECTION, SOLUTION INTRAVENOUS at 14:43

## 2025-04-15 RX ADMIN — BACLOFEN 10 MG: 10 TABLET ORAL at 00:55

## 2025-04-15 RX ADMIN — GLYCOPYRROLATE 0.2 MG: 0.2 INJECTION INTRAMUSCULAR; INTRAVENOUS at 08:22

## 2025-04-15 RX ADMIN — CEFAZOLIN 2 G: 1 INJECTION, POWDER, FOR SOLUTION INTRAMUSCULAR; INTRAVENOUS at 07:46

## 2025-04-15 RX ADMIN — ATORVASTATIN CALCIUM 40 MG: 40 TABLET, FILM COATED ORAL at 17:24

## 2025-04-15 RX ADMIN — CEFAZOLIN SODIUM 2 G: 2 INJECTION, SOLUTION INTRAVENOUS at 17:24

## 2025-04-15 RX ADMIN — Medication 80 MCG: at 08:48

## 2025-04-15 RX ADMIN — DEXAMETHASONE SODIUM PHOSPHATE 8 MG: 4 INJECTION, SOLUTION INTRA-ARTICULAR; INTRALESIONAL; INTRAMUSCULAR; INTRAVENOUS; SOFT TISSUE at 08:03

## 2025-04-15 RX ADMIN — HYDROMORPHONE HYDROCHLORIDE 0.5 MG: 1 INJECTION, SOLUTION INTRAMUSCULAR; INTRAVENOUS; SUBCUTANEOUS at 09:55

## 2025-04-15 RX ADMIN — SODIUM CHLORIDE, SODIUM LACTATE, POTASSIUM CHLORIDE, AND CALCIUM CHLORIDE: 600; 310; 30; 20 INJECTION, SOLUTION INTRAVENOUS at 09:48

## 2025-04-15 RX ADMIN — Medication 80 MCG: at 09:32

## 2025-04-15 RX ADMIN — TRANEXAMIC ACID 1000 MG: 10 INJECTION, SOLUTION INTRAVENOUS at 07:47

## 2025-04-15 RX ADMIN — ROCURONIUM BROMIDE 40 MG: 10 INJECTION INTRAVENOUS at 08:15

## 2025-04-15 RX ADMIN — ALBUMIN HUMAN 250 ML: 0.05 INJECTION, SOLUTION INTRAVENOUS at 08:31

## 2025-04-15 RX ADMIN — Medication 80 MCG: at 09:12

## 2025-04-15 RX ADMIN — Medication 120 MCG: at 07:50

## 2025-04-15 RX ADMIN — Medication 80 MCG: at 09:28

## 2025-04-15 SDOH — SOCIAL STABILITY: SOCIAL INSECURITY
WITHIN THE LAST YEAR, HAVE YOU BEEN KICKED, HIT, SLAPPED, OR OTHERWISE PHYSICALLY HURT BY YOUR PARTNER OR EX-PARTNER?: NO

## 2025-04-15 SDOH — SOCIAL STABILITY: SOCIAL INSECURITY: DOES ANYONE TRY TO KEEP YOU FROM HAVING/CONTACTING OTHER FRIENDS OR DOING THINGS OUTSIDE YOUR HOME?: NO

## 2025-04-15 SDOH — ECONOMIC STABILITY: FOOD INSECURITY: WITHIN THE PAST 12 MONTHS, YOU WORRIED THAT YOUR FOOD WOULD RUN OUT BEFORE YOU GOT THE MONEY TO BUY MORE.: NEVER TRUE

## 2025-04-15 SDOH — ECONOMIC STABILITY: HOUSING INSECURITY: IN THE LAST 12 MONTHS, WAS THERE A TIME WHEN YOU WERE NOT ABLE TO PAY THE MORTGAGE OR RENT ON TIME?: NO

## 2025-04-15 SDOH — ECONOMIC STABILITY: FOOD INSECURITY: WITHIN THE PAST 12 MONTHS, THE FOOD YOU BOUGHT JUST DIDN'T LAST AND YOU DIDN'T HAVE MONEY TO GET MORE.: NEVER TRUE

## 2025-04-15 SDOH — SOCIAL STABILITY: SOCIAL INSECURITY: WITHIN THE LAST YEAR, HAVE YOU BEEN AFRAID OF YOUR PARTNER OR EX-PARTNER?: NO

## 2025-04-15 SDOH — SOCIAL STABILITY: SOCIAL INSECURITY: WITHIN THE LAST YEAR, HAVE YOU BEEN HUMILIATED OR EMOTIONALLY ABUSED IN OTHER WAYS BY YOUR PARTNER OR EX-PARTNER?: NO

## 2025-04-15 SDOH — ECONOMIC STABILITY: INCOME INSECURITY: IN THE PAST 12 MONTHS HAS THE ELECTRIC, GAS, OIL, OR WATER COMPANY THREATENED TO SHUT OFF SERVICES IN YOUR HOME?: NO

## 2025-04-15 SDOH — SOCIAL STABILITY: SOCIAL INSECURITY: WERE YOU ABLE TO COMPLETE ALL THE BEHAVIORAL HEALTH SCREENINGS?: YES

## 2025-04-15 SDOH — ECONOMIC STABILITY: HOUSING INSECURITY: AT ANY TIME IN THE PAST 12 MONTHS, WERE YOU HOMELESS OR LIVING IN A SHELTER (INCLUDING NOW)?: NO

## 2025-04-15 SDOH — HEALTH STABILITY: PHYSICAL HEALTH: ON AVERAGE, HOW MANY DAYS PER WEEK DO YOU ENGAGE IN MODERATE TO STRENUOUS EXERCISE (LIKE A BRISK WALK)?: 0 DAYS

## 2025-04-15 SDOH — SOCIAL STABILITY: SOCIAL INSECURITY: ABUSE: ADULT

## 2025-04-15 SDOH — HEALTH STABILITY: MENTAL HEALTH
DO YOU FEEL STRESS - TENSE, RESTLESS, NERVOUS, OR ANXIOUS, OR UNABLE TO SLEEP AT NIGHT BECAUSE YOUR MIND IS TROUBLED ALL THE TIME - THESE DAYS?: NOT AT ALL

## 2025-04-15 SDOH — SOCIAL STABILITY: SOCIAL INSECURITY: DO YOU FEEL ANYONE HAS EXPLOITED OR TAKEN ADVANTAGE OF YOU FINANCIALLY OR OF YOUR PERSONAL PROPERTY?: NO

## 2025-04-15 SDOH — SOCIAL STABILITY: SOCIAL INSECURITY
WITHIN THE LAST YEAR, HAVE YOU BEEN RAPED OR FORCED TO HAVE ANY KIND OF SEXUAL ACTIVITY BY YOUR PARTNER OR EX-PARTNER?: NO

## 2025-04-15 SDOH — SOCIAL STABILITY: SOCIAL INSECURITY: HAS ANYONE EVER THREATENED TO HURT YOUR FAMILY OR YOUR PETS?: NO

## 2025-04-15 SDOH — HEALTH STABILITY: MENTAL HEALTH: CURRENT SMOKER: 0

## 2025-04-15 SDOH — SOCIAL STABILITY: SOCIAL INSECURITY: HAVE YOU HAD ANY THOUGHTS OF HARMING ANYONE ELSE?: NO

## 2025-04-15 SDOH — ECONOMIC STABILITY: FOOD INSECURITY: HOW HARD IS IT FOR YOU TO PAY FOR THE VERY BASICS LIKE FOOD, HOUSING, MEDICAL CARE, AND HEATING?: NOT HARD AT ALL

## 2025-04-15 SDOH — ECONOMIC STABILITY: TRANSPORTATION INSECURITY: IN THE PAST 12 MONTHS, HAS LACK OF TRANSPORTATION KEPT YOU FROM MEDICAL APPOINTMENTS OR FROM GETTING MEDICATIONS?: NO

## 2025-04-15 SDOH — SOCIAL STABILITY: SOCIAL INSECURITY: ARE THERE ANY APPARENT SIGNS OF INJURIES/BEHAVIORS THAT COULD BE RELATED TO ABUSE/NEGLECT?: NO

## 2025-04-15 SDOH — ECONOMIC STABILITY: HOUSING INSECURITY: IN THE PAST 12 MONTHS, HOW MANY TIMES HAVE YOU MOVED WHERE YOU WERE LIVING?: 1

## 2025-04-15 SDOH — SOCIAL STABILITY: SOCIAL INSECURITY: HAVE YOU HAD THOUGHTS OF HARMING ANYONE ELSE?: NO

## 2025-04-15 SDOH — SOCIAL STABILITY: SOCIAL INSECURITY: ARE YOU OR HAVE YOU BEEN THREATENED OR ABUSED PHYSICALLY, EMOTIONALLY, OR SEXUALLY BY ANYONE?: NO

## 2025-04-15 SDOH — SOCIAL STABILITY: SOCIAL INSECURITY: DO YOU FEEL UNSAFE GOING BACK TO THE PLACE WHERE YOU ARE LIVING?: NO

## 2025-04-15 ASSESSMENT — PAIN SCALES - GENERAL

## 2025-04-15 ASSESSMENT — PAIN - FUNCTIONAL ASSESSMENT

## 2025-04-15 ASSESSMENT — COGNITIVE AND FUNCTIONAL STATUS - GENERAL
MOBILITY SCORE: 17
MOBILITY SCORE: 17
MOVING FROM LYING ON BACK TO SITTING ON SIDE OF FLAT BED WITH BEDRAILS: A LITTLE
HELP NEEDED FOR BATHING: A LITTLE
CLIMB 3 TO 5 STEPS WITH RAILING: A LITTLE
WALKING IN HOSPITAL ROOM: A LITTLE
CLIMB 3 TO 5 STEPS WITH RAILING: A LITTLE
DAILY ACTIVITIY SCORE: 22
STANDING UP FROM CHAIR USING ARMS: A LITTLE
DAILY ACTIVITIY SCORE: 22
MOVING TO AND FROM BED TO CHAIR: A LOT
TOILETING: A LITTLE
MOVING FROM LYING ON BACK TO SITTING ON SIDE OF FLAT BED WITH BEDRAILS: A LITTLE
TOILETING: A LITTLE
TURNING FROM BACK TO SIDE WHILE IN FLAT BAD: A LITTLE
STANDING UP FROM CHAIR USING ARMS: A LITTLE
MOVING TO AND FROM BED TO CHAIR: A LOT
PATIENT BASELINE BEDBOUND: NO
HELP NEEDED FOR BATHING: A LITTLE
WALKING IN HOSPITAL ROOM: A LITTLE
TURNING FROM BACK TO SIDE WHILE IN FLAT BAD: A LITTLE

## 2025-04-15 ASSESSMENT — ACTIVITIES OF DAILY LIVING (ADL)
FEEDING YOURSELF: INDEPENDENT
BATHING: NEEDS ASSISTANCE
LACK_OF_TRANSPORTATION: NO
GROOMING: INDEPENDENT
PATIENT'S MEMORY ADEQUATE TO SAFELY COMPLETE DAILY ACTIVITIES?: YES
ADEQUATE_TO_COMPLETE_ADL: YES
WALKS IN HOME: NEEDS ASSISTANCE
JUDGMENT_ADEQUATE_SAFELY_COMPLETE_DAILY_ACTIVITIES: YES
HEARING - LEFT EAR: FUNCTIONAL
DRESSING YOURSELF: INDEPENDENT
HEARING - RIGHT EAR: FUNCTIONAL
TOILETING: NEEDS ASSISTANCE
ASSISTIVE_DEVICE: WHEELCHAIR;OTHER (COMMENT)

## 2025-04-15 ASSESSMENT — PATIENT HEALTH QUESTIONNAIRE - PHQ9
2. FEELING DOWN, DEPRESSED OR HOPELESS: NOT AT ALL
1. LITTLE INTEREST OR PLEASURE IN DOING THINGS: NOT AT ALL
SUM OF ALL RESPONSES TO PHQ9 QUESTIONS 1 & 2: 0

## 2025-04-15 ASSESSMENT — LIFESTYLE VARIABLES
HOW OFTEN DO YOU HAVE 6 OR MORE DRINKS ON ONE OCCASION: NEVER
SKIP TO QUESTIONS 9-10: 1
HOW MANY STANDARD DRINKS CONTAINING ALCOHOL DO YOU HAVE ON A TYPICAL DAY: PATIENT DOES NOT DRINK
HOW OFTEN DO YOU HAVE A DRINK CONTAINING ALCOHOL: NEVER
AUDIT-C TOTAL SCORE: 0
AUDIT-C TOTAL SCORE: 0

## 2025-04-15 ASSESSMENT — COLUMBIA-SUICIDE SEVERITY RATING SCALE - C-SSRS
2. HAVE YOU ACTUALLY HAD ANY THOUGHTS OF KILLING YOURSELF?: NO
6. HAVE YOU EVER DONE ANYTHING, STARTED TO DO ANYTHING, OR PREPARED TO DO ANYTHING TO END YOUR LIFE?: NO
1. IN THE PAST MONTH, HAVE YOU WISHED YOU WERE DEAD OR WISHED YOU COULD GO TO SLEEP AND NOT WAKE UP?: NO

## 2025-04-15 NOTE — ANESTHESIA PROCEDURE NOTES
Peripheral IV  Date/Time: 4/15/2025 7:57 AM  Inserted by: DANIEL Mejia    Placement  Needle size: 20 G  Laterality: left  Location: wrist  Local anesthetic: none  Site prep: alcohol  Technique: anatomical landmarks  Attempts: 1

## 2025-04-15 NOTE — ED TRIAGE NOTES
Pt is a transfer from [facility] for an ortho consult. Pt states she has a hx of MS and at home she has a device to help her stand. 2 weeks ago she fell out of her standing device resulting in pain to her left leg. Since the fall pt states she was being stubborn and hoping the pain would resolve itself. Pt arrived to [facility] after her PCP told her to go to the hospital. Scans at OSH showed a DVT above her left knee and a left femur fx, ortho and vascular want her to have the fx repaired prior to starting heparin. Pt arrives to [facility] A+Ox4, with no complaints at this time.

## 2025-04-15 NOTE — PROGRESS NOTES
"Orthopaedic Surgery Progress Note  Orthopaedic Injury: L subacute distal femur fx    S:    Evaluated post-operatively. Pain controlled on current regimen.  Denies any new onset numbness, tingling or weakness. Denies nausea, vomiting, chest pain, dyspnea, or calf tenderness. Denies any fever or chills. PT.    O:  /79   Pulse 84   Temp 36.2 °C (97.2 °F) (Temporal)   Resp 18   Ht 1.753 m (5' 9\")   Wt 54.4 kg (120 lb)   SpO2 97%   BMI 17.72 kg/m²     GEN - NAD, resting comfortably in hospital bed  HEENT - MMM, EOMI, NCAT   CV - RRR by peripheral palpation, limbs wwp  PULM - NWOB on RA  ABD - Non-distended  NEURO - MCCONNELL spontaneously, CNs II - XII grossly intact  PSYCH - Appropriate mood and affect    MSK:  LLE:   - Tender at surgical site with painful ROM   - Post-operative dressings (staples, Mepilex x4, ACE) c/d/i  - Motor intact in DF/PF/EHL/FHL  - SILT in saph/sural/SPN/DPN distributions  - Foot wwp, 2+ DP/PT pulse, brisk cap refill  - Compartments soft and compressible, no pain with passive dorsiflexion     A/P: 64 y.o. female s/p L femur IMN on 4/15 with Dr. Tyler.    Plan:  - Weight bearing: WBAT LLE  - Dressing: staples, Mepilex x4, ACE  - DVT ppx: SCDs, okay for chemo PPx per primary;  recommend at least ASA 81 mg BID for 4 weeks post-op  - Diet: Okay for diet from orthopedic perspective   - Perioperative Antibiotics: 24 hour perioperative ancef   - Please send with Calcium (as carbonate)-Vitamin D 600mg-400IU PO BID for 30 days upon discharge   - Drain: none  - Post-operative Imaging: None   - No plans to return to the OR with orthopedic surgery this admission.     This plan was discussed with the attending, Dr. Tyler.     Melinda Villalpando, DO  Orthopaedic Surgery PGY-1  [facility]     While admitted, this patient will be followed by the Ortho Trauma Team. Please contact below residents with any questions (available via Epic Chat).      First call: John Flanagan, " PGY-1  Second call: Gaetano Cheek, PGY-2  Third call: Luzma Ross, PGY-3    From 6pm-7am, weekends, holidays, and if no answer and there is an emergent issue, please page orthopaedic consult pager, [phone].

## 2025-04-15 NOTE — ED PROVIDER NOTES
CC: Leg Injury     History provided by: Patient  Limitations to History: None    HPI:    Patient is a 64-year-old female with a PMH of multiple sclerosis, TIA, and hyperlipidemia who presents to the emergency department as a transfer from [facility] for left lower extremity injury s/p fall.  Patient reports that he she uses a transfer chair due to her multiple sclerosis.  Approximately 2 weeks ago she was transferring and her left lower extremity twisted behind her.  She presented to [facility] after her friend instructed her to after seeing her.  At [facility] the patient was found to have a distal left femur fracture and a nonocclusive left femoral DVT.  She was transferred to [facility] for trauma surgery and orthopedic surgery evaluation.  Currently the patient reports no left lower extremity pain, shortness of breath, or chest pain    External Records Reviewed: Previous ED records, inpatient records, and outpatient  ???????????????????????????????????????????????????????????????  Triage Vitals:  T    HR 78  BP 95/78  RR 18  O2 97 % None (Room air)    Physical Exam  Constitutional:       General: She is awake. She is not in acute distress.     Appearance: She is not ill-appearing, toxic-appearing or diaphoretic.   HENT:      Head: Normocephalic and atraumatic.   Neck:      Comments: No midline cervical tenderness or step-offs.  No evidence of meningismus.  Cardiovascular:      Rate and Rhythm: Normal rate and regular rhythm.      Pulses:           Radial pulses are 2+ on the right side and 2+ on the left side.        Dorsalis pedis pulses are 2+ on the right side and 2+ on the left side.      Heart sounds: Normal heart sounds, S1 normal and S2 normal. Heart sounds not distant. No murmur heard.     No friction rub.   Pulmonary:      Effort: Pulmonary effort is normal. No respiratory distress.      Breath sounds: Normal breath sounds.      Comments: Lungs are clear to auscultation no evidence of wheezing or  crackles.  Chest:      Comments: There is no chest wall tenderness, ecchymosis, flail segments, or erythema  Abdominal:      General: Abdomen is flat. There is no distension.      Palpations: Abdomen is soft.      Tenderness: There is no abdominal tenderness. There is no guarding or rebound.   Musculoskeletal:      Right lower leg: No edema.      Left lower leg: No edema.      Comments: The left lower extremity is in a short leg cast.  Capillary refill is less than 2 seconds in all digits of the left lower extremity.  Patient is able to wiggle her toes of the left lower extremity.  There is 1+ pitting edema to the left distal thigh.  Compartments are soft and compressible.   Skin:     General: Skin is warm and dry.      Capillary Refill: Capillary refill takes less than 2 seconds.   Neurological:      Mental Status: She is alert and oriented to person, place, and time.      GCS: GCS eye subscore is 4. GCS verbal subscore is 5. GCS motor subscore is 6.   Psychiatric:         Behavior: Behavior is cooperative.        ???????????????????????????????????????????????????????????????  ED Course/Treatment/Medical Decision Making    EKG Interpretation:   Normal sinus rhythm. Rate of 76 bpm. Normal axis. Normal intervals. No acute ST elevations, depressions, or T wave inversions.    Social Determinants Limiting Care:  None identified         ED Course:  Diagnoses as of 04/17/25 0941   Closed bicondylar fracture of distal femur, left, initial encounter   Acute deep vein thrombosis (DVT) of femoral vein of left lower extremity       MDM:    Patient is a 64-year-old female who presents to the emergency department as a transfer from [facility] for chief complaint of left lower extremity injury.  Upon arrival patient's vital signs are within normal limits, she is nontoxic-appearing, and appears no acute distress.  Upon examination there is no stigmata of additional traumatic injury.  Orthopedic surgery and trauma surgery have been  consulted for evaluation.  Type and screen and venogram have been ordered.  Given patient's known left femoral DVT heparin GTT without bolus will be ordered at the request of trauma surgery.  Patient will be admitted to the trauma surgery service in stable condition for further management with plans for orthopedic surgery operative management    Impression:  Closed bicondylar fracture of left distal femur  Acute DVT of left femoral vein    Disposition:  Admit to trauma surgery    Michael Fox DO   Emergency Medicine, PGY-2      Procedures ? SmartLinks last updated 4/15/2025 4:08 AM          Michael Fox DO  Resident  04/17/25 0948

## 2025-04-15 NOTE — HOSPITAL COURSE
64F presented as a trauma consult, transfer from outside hospital, after fall 2 weeks prior to presentation. Imaging showed L femur fracture and L femoral vein DVT. Patient placed on Heparin gtt for DVT. Patient to OR with orthopedics on 4/15 for L femur IMN. Orthopedics recommended WBAT LLE and ASA BID x 4 weeks post-op for chemoprophylaxis. Patient to take Calcium/Vitamin D x 30 days on discharge. Surgical dressing may be removed 4/22. Patient to follow up with orthopedics as an outpatient. Post-operative CBC with Hgb 6.9. Patient received 1u PRBCs with appropriate incrementation to 9.2. PT/OT recommended low intensity at discharge. Wound care consulted while inpatient for chronic sacral wound.   At the time of discharge, patient's pain was controlled with oral analgesia, patient was urinating, having BMs, sleeping, and eating well. Based on PT/OT's recommendation, patient was discharged home with home care/wound care with scripts and follow up appointments. Discharge plan was discussed with the patient and all of the patient's questions were answered.

## 2025-04-15 NOTE — CONSULTS
"ORTHOPAEDIC SURGERY CONSULT NOTE     HPI:   64F (MS on methotrexate, R decub ulcer) t/f [facility] p/a standing for transfer 2 wk ago. Nonambulatory x 5 yrs. Initially with pain/swelling which decreased, however presented to [facility] with persistent pain. Also w L femoral DVT - per Trauma planning to start hep gtt postop.    PMH: per HPI/EMR  PSH: per HPI/EMR  SocHx: Denies alcohol, tobacco, or illicit drug use   Ambulatory Status: Nonambulator   FamHx:  Non-contributory to this patient's acute orthopaedic problem other than as mentioned in HPI  Allergies:   Allergies  Reviewed by Becky Richter RN on 4/15/2025        Severity Reactions Comments    Penicillins Medium Hives hives    Tolterodine Medium Unknown Urinary retention          Medications: Denies home anticoagulation use   Current Outpatient Medications   Medication Instructions    aspirin 81 mg EC tablet 1 tablet, Daily    atorvastatin (LIPITOR) 40 mg, oral, Daily    baclofen (LIORESAL) 10 mg, oral, 4 times daily    levoFLOXacin (LEVAQUIN) 500 mg, Daily    methotrexate (Trexall) 2.5 mg tablet TAKE 6 TABLETS ONCE EVERY WEEK. FOLLOW DIRECTIONS CAREFULLY AND ASK TO EXPLAIN ANY PART YOU DO NOT UNDERSTAND. TAKE EXACTLY AS DIRECTED.    nitrofurantoin, macrocrystal-monohydrate, (Macrobid) 100 mg capsule 100 mg, oral, Daily    nitrofurantoin, macrocrystal-monohydrate, (Macrobid) 100 mg capsule 100 mg, oral, Daily     ROS: 14 point ROS negative except as above    OBJECTIVE:  /79   Pulse 84   Temp 36.2 °C (97.2 °F) (Temporal)   Resp 18   Ht 1.753 m (5' 9\")   Wt 54.4 kg (120 lb)   SpO2 97%   BMI 17.72 kg/m²     Physical Exam:  - Constitutional: No acute distress, cooperative  - Eyes: EOM grossly intact  - Head/Neck: Trachea midline  - Respiratory/Thorax: Normal work of breathing  - Cardiovascular: RRR on peripheral palpation  - Gastrointestinal: Nondistended  - Psychological: Appropriate mood/behavior  - Skin: Warm and dry. Additional findings in " "musculoskeletal evaluation  - Musculoskeletal:  LLE:  - Skin intact, no threatened or tenting skin  - Tender at site of injury with painful ROM.  - Fires DF/PF/EHL/FHL  - Decreased sensation (baseline) distal to distal thigh  - Foot warm, well perfused  - Palpable DP/PT pulse, brisk cap refill  - Compartments soft and compressible    A full secondary survey was conducted. Patient did not have any acute pain with ROM or palpation of other extremities other than that which is mentioned below.    Last Recorded Vitals  Blood pressure 120/79, pulse 84, temperature 36.2 °C (97.2 °F), temperature source Temporal, resp. rate 18, height 1.753 m (5' 9\"), weight 54.4 kg (120 lb), SpO2 97%.  Intake/Output last 3 Shifts:  No intake/output data recorded.    Imaging:  AP and lateral radiographs and CT of the left femur display subacute left distal femur fracture without intra-articular extension. 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    Assessment:  Injury: L subacute distal femur fx    64F (MS on methotrexate, R decub ulcer) t/f [facility] p/a standing for transfer 2 wk ago. Also w L femoral DVT, Vasc consult pending. Nonambulatory x5 yrs. Closed, NVI. No threatened skin. XR/CT w anterior spike, no intra-articular extension. KI.    Plan:   - NPO for upcoming procedure  - Appreciate excellent care per Trauma, please document medical clearance when able (complete)  - Please place martinez and obtain pre-operative labs: T&S, PT/INR, CBC, EKG, CXR (complete)  - Consented and posted to OR schedule for L femur Kee w/Dr. Tyler   - NWB left lower extremity    This patient was seen and evaluated within 30 minutes of consultation. Plan not finalized until signed by attending.    Farzaneh Chapman MD  Orthopaedic Surgery  PGY-2  Resident On Call  Pager: [phone] (EpicChat preferred)    This patient will be followed by Ortho Trauma team (All chat preferred):    1st call: John Coombs, PGY-1  1st call: Melinda Villalpando, " PGY-1  2nd call: Gaetano Cheek, PGY-2  3rd call: Luzma Ross, PGY-3    On weekends and after 6PM:  At [facility]: Please reach out to the orthopaedic on-call resident ([phone])  At [facility]: Please reach out to the orthopaedic on-call TOBY or resident (please refer to Naren)

## 2025-04-15 NOTE — BRIEF OP NOTE
Date: 4/15/2025  OR Location: [facility] OR    Name: Kristi Pat, : 1960, Age: 64 y.o., MRN: [id number], Sex: female    Diagnosis  Pre-op Diagnosis      * Closed fracture of distal end of left femur, unspecified fracture morphology, initial encounter [C92.402A] Post-op Diagnosis     * Closed fracture of distal end of left femur, unspecified fracture morphology, initial encounter [R42402A]     Procedures  INSERTION, RETROGRADE INTRAMEDULLARY MERLINE, FEMUR  [id number] - WV OPTX FEM SHFT FX W/INSJ IMED IMPLT W/WO SCREW      Surgeons      * Harvinder Tyler - Primary    Resident/Fellow/Other Assistant:  Surgeons and Role:     * Melinda Villalpando DO - Resident - Assisting     * Moise Shepard MD - Fellow    Staff:   Circulator: Daniel Crawford Person: Alonso    Anesthesia Staff: Anesthesiologist: Ervin Richardson DO  C-AA: DANIEL Rodriguez; DANIEL Mejia    Procedure Summary  Anesthesia: General  ASA: III  Estimated Blood Loss: 300mL - Please see full Op Note  Intra-op Medications:   Administrations occurring from 0650 to 0915 on 04/15/25:   Medication Name Total Dose   sodium chloride 0.9 % irrigation solution 1,000 mL   heparin bolus from bag 4,400 Units Cannot be calculated   albumin human bottle 5% 500 mL   ceFAZolin (Ancef) vial 1 g 2 g   dexAMETHasone (Decadron) injection 4 mg/mL 8 mg   fentaNYL (Sublimaze) injection 50 mcg/mL 100 mcg   glycopyrrolate (Robinul) injection 0.2 mg/mL 0.2 mg   LR bolus Cannot be calculated   lidocaine (Xylocaine) injection 2 % 80 mg   midazolam PF (Versed) injection 1 mg/mL 2 mg   phenylephrine 40 mcg/mL syringe 10 mL 1,000 mcg   propofol (Diprivan) injection 10 mg/mL 100 mg   rocuronium (ZeMuron) 50 mg/5 mL injection 100 mg   tranexamic acid 1,000 mg/100 mL NS (premix) 1,000 mg              Anesthesia Record               Intraprocedure I/O Totals          Intake    LR bolus 1000.00 mL    Tranexamic Acid 0.00 mL    The total shown is the total volume  documented since Anesthesia Start was filed.    albumin human bottle 5% 500.00 mL    Total Intake 1500 mL       Output    Est. Blood Loss 300 mL    Total Output 300 mL       Net    Net Volume 1200 mL          Specimen: No specimens collected               Findings: L subacute distal femur fx    Complications:  None; patient tolerated the procedure well.     Disposition: PACU - hemodynamically stable.  Condition: stable  Specimens Collected: No specimens collected  Attending Attestation:     Harvinder Tyler  Phone Number: [phone]

## 2025-04-15 NOTE — H&P
[facility]  TRAUMA SERVICE - HISTORY AND PHYSICAL / CONSULT    Patient Name: Kristi Pat  MRN: [id number]  Admit Date: 415  : 1960  AGE: 64 y.o.   GENDER: female  ==============================================================================  MECHANISM OF INJURY / CHIEF COMPLAINT:   64 y.o. female presenting as a transfer [facility] for left distal femur fx and left leg DVT. Patient reports that 2 weeks ago she was using her lift at home to get out of a chair when her left leg got caught and twisted. She felt an intense pain above her left knee at the time with subsequent swelling/pain. The pain resolved within a few days and the swelling has diminished according to the patient.  Past medical history of multiple sclerosis on methotrexate and baclofen.   Also was found to have a concomitant DVT of the left leg at OSH.       LOC (yes/no?): no  Anticoagulant / Anti-platelet Rx? (for what dx?): no  Referring Facility Name (N/A for scene EMR run): [facility]    INJURIES:   subacute distal femur fracture   nonocclusive left femoral vein DVT     OTHER MEDICAL PROBLEMS:  Multiple sclerosis  TIA  Recurrent UTI    INCIDENTAL FINDINGS:  none    ==============================================================================  ADMISSION PLAN OF CARE:  Clear for Ortho OR for left femur fixation (RCRI = 1 for hx of TIA)  Pre op labs and imaging complete  Starting heparin drip for DVT (no bolus) will stop one hour prior to OR  NPO, NWB left lower extremity    Consultants notified (specialty, provider name, time): Ortho    Dispo: Trauma Floor    ==============================================================================  PAST MEDICAL HISTORY:   PMH:   Multiple Sclerosis  Past Medical History:   Diagnosis Date    Encounter for screening for malignant neoplasm of cervix 2017    Pap smear for cervical cancer screening    Encounter for screening mammogram for malignant neoplasm of  breast 2022    Encounter for screening mammogram for breast cancer       PSH:   Past Surgical History:   Procedure Laterality Date    MR HEAD ANGIO WO IV CONTRAST  2022    MR HEAD ANGIO WO IV CONTRAST 2022 [facility]    MR NECK ANGIO WO IV CONTRAST  2022    MR NECK ANGIO WO IV CONTRAST 2022 [facility]     FH:   Family History   Problem Relation Name Age of Onset    Thyroid disease Mother      Arthritis Mother      Diabetes Mother      Heart disease Father      Gout Father      Gout Brother      Breast cancer Maternal Grandmother      Breast cancer Paternal Grandmother       SOCIAL HISTORY:    Smoking:    Social History     Tobacco Use   Smoking Status Former    Current packs/day: 0.00    Average packs/day: 0.3 packs/day for 5.2 years (1.3 ttl pk-yrs)    Types: Cigarettes    Start date: 1979    Quit date: 1984    Years since quittin.6   Smokeless Tobacco Never       Alcohol:    Social History     Substance and Sexual Activity   Alcohol Use Not Currently       Drug use: none    MEDICATIONS:   Prior to Admission medications    Medication Sig Start Date End Date Taking? Authorizing Provider   aspirin 81 mg EC tablet Take 1 tablet (81 mg) by mouth once daily. 23   Historical Provider, MD   atorvastatin (Lipitor) 40 mg tablet Take 1 tablet (40 mg) by mouth once daily. 3/24/25   Rhonda Qureshi MD   baclofen (Lioresal) 10 mg tablet Take 1 tablet (10 mg) by mouth 4 times a day.  Patient taking differently: Take 1-2 tablets (10-20 mg) by mouth 3 times a day. Taking 20mg morning and afternoon and 10mg in the evening 3/24/25 3/24/26  Rhonda Qureshi MD   levoFLOXacin (Levaquin) 500 mg tablet Take 1 tablet (500 mg) by mouth once daily. For 10 days ( as of 25 5 days left to go )    Historical Provider, MD   methotrexate (Trexall) 2.5 mg tablet TAKE 6 TABLETS ONCE EVERY WEEK. FOLLOW DIRECTIONS CAREFULLY AND ASK TO EXPLAIN ANY PART YOU DO NOT UNDERSTAND. TAKE  EXACTLY AS DIRECTED.  Patient taking differently: Take 6 tablets (15 mg total) by mouth 1 (one) time per week.  Saturday 10/28/24   EDER Espinosa-CNP   nitrofurantoin, macrocrystal-monohydrate, (Macrobid) 100 mg capsule TAKE 1 CAPSULE DAILY 11/16/24   Prince Don MD   nitrofurantoin, macrocrystal-monohydrate, (Macrobid) 100 mg capsule Take 1 capsule (100 mg) by mouth once daily. 11/13/24   Prince Don MD   multivitamin with minerals (multivitamin-iron-folic acid) tablet Take 1 tablet by mouth once daily. 10/30/20 4/14/25  Historical Provider, MD     ALLERGIES:   Allergies   Allergen Reactions    Penicillins Hives     hives    Tolterodine Unknown     Urinary retention         PHYSICAL EXAM:  PRIMARY SURVEY:  Airway  Airway is patent.   Breathing  Breathing is normal. Right breath sounds are normal. Left breath sounds are normal.  Circulation  Rate is regular.   Pulses  Radial: 2+ on the right; 2+ on the left.  Pedal: 2+ on the right; 2+ on the left.  Disability  Chhaya Coma Score  Eye:4   Verbal:5   Motor:6      15  Pupils  Right Pupil:   round and reactive      3 mm  Left Pupil:   round and reactive      3 mm     Motor Strength   strength:  5/5 on the right  5/5 on the left  Dorsiflex strength:  5/5 on the right  5/5 on the left  Plantarflex strength:  5/5 on the right  5/5 on the left  The patient does not have a sensory deficit.        SECONDARY SURVEY/PHYSICAL EXAM:  Physical Exam  Secondary Survey:  NEURO: A&O x4 , GCS 15, MCCONNELL, muscle strength 5/5 GS BUE, DF/PF BLE, no sensory deficits  HEAD: NC, No lacerations or abrasions or hematomas, no bony step offs, midface stable.  EENT: PERRL, EOMI. Pupils 3-2mm b/l. external ear without laceration. Nasal septum midline, no crepitus or septal hematoma. Oral mucosa and tongue without lacerations, teeth in place.  NECK: No cervical spine tenderness or step offs, trachea midline.  RESPIRATORY/CHEST: No abrasions, contusions, crepitus or tenderness  to palpation. Non-labored, equal chest expansion, CTAB  CV: Regular rate. Pulses bilateral: 2+ radial, 2+DP. No TTP of chest  ABDOMEN: soft, nontender, nondistended. No abrasions or lacerations.    PELVIS: Stable to compression.   EXTREMITIES: Obvious deformity to L distal thigh.  There is tenderness to the distal L thigh and L knee.  There is diffuse LLE swelling as well as ecchymosis throughout the L thigh, but no erythema, or warmth..  No wounds to LLE. ROM is limited at the hip and knee. DP, PT pulses are 2+ bilaterally. Cap refill brisk. SILT throughout the L lower extremity.       IMAGING SUMMARY:  (summary of findings, not a copy of dictation)  CT Head/Face: NA  CT C-Spine: NA  CT Chest/Abd/Pelvis: NA  CXR/PXR: no acute cardiopulmonary process  Xray: Markedly displaced and angulated distal femoral fracture    LABS:  Results from last 7 days   Lab Units 04/14/25  1231 04/14/25  1018 04/14/25  1015   WBC AUTO x10*3/uL 5.1  --   --    [facility] WBC AUTO [place]/uL  --  5.0 4.9   HEMOGLOBIN g/dL 10.2*  --   --    [facility] HEMOGLOBIN g/dL  --  10.3* 10.4*   HEMATOCRIT % 33.6*  --   --    [facility] HEMATOCRIT %  --  33.9* 34.2*   PLATELETS AUTO x10*3/uL 215  --   --    [facility] PLATELETS AUTO [place]/uL  --  266 267   NEUTROS PCT AUTO % 75.3  --   --    LYMPHS PCT AUTO % 17.0  --   --    [facility] LYMPHO PCT MAN %  --  15.1  --    MONOS PCT AUTO % 6.1  --   --    [facility] MONO PCT MAN %  --  5.2  --    EOS PCT AUTO % 1.0  --   --    [facility] EOSINO PCT MAN %  --  1.0  --      Results from last 7 days   Lab Units 04/14/25  1231   APTT seconds 29   INR  1.1     Results from last 7 days   Lab Units 04/14/25  1231 04/14/25  1015   [facility] SODIUM mmol/L  --  142   SODIUM mmol/L 141  --    [facility] POTASSIUM mmol/L  --  4.1   POTASSIUM mmol/L 4.3  --    [facility] CHLORIDE mmol/L  --  108   CHLORIDE mmol/L 109*  --    [facility] CO2 mmol/L  --  27   CO2 mmol/L 27  --    BUN mg/dL 19  --    [facility] BUN mg/dL  --  19   CREATININE mg/dL 0.74  --    [facility]  CREATININE mg/dL  --  0.77   [facility] CALCIUM mg/dL  --  8.8   CALCIUM mg/dL 8.9  --    [facility] PROTEIN TOTAL g/dL  --  6.0*   PROTEIN TOTAL g/dL 6.0*  --    BILIRUBIN TOTAL mg/dL 1.5*  --    [facility] BILIRUBIN TOTAL mg/dL  --  1.2   [facility] ALK PHOS U/L  --  151   ALK PHOS U/L 164*  --    [facility] ALT U/L  --  18   ALT U/L 19  --    [facility] AST U/L  --  30   AST U/L 34  --    [facility] GLUCOSE mg/dL  --  79   GLUCOSE mg/dL 83  --      Results from last 7 days   Lab Units 04/14/25  1231 04/14/25  1015   BILIRUBIN TOTAL mg/dL 1.5*  --    [facility] BILIRUBIN TOTAL mg/dL  --  1.2           I have reviewed all laboratory and imaging results ordered/pertinent for this encounter.

## 2025-04-15 NOTE — SIGNIFICANT EVENT
TRAUMA PREOPERATIVE EVALUATION:    Surgical Procedure Planned: left femur fixation    Anticipated Date of Surgery: 4/15/25    EKG interpretation:    normal EKG, normal sinus rhythm and unchanged from previous tracings    EKG findings indicating need for echocardiogram:  None. ECHO not indicated.    BNP >100 no    Pacemaker: No :     Indications for perioperative medicine consultation:    1. Acute Ischemic Heart Disease: No    2. New or Uncontrolled Arrhythmia: No    3. Clinical Evidence of Worsening Heart Failure: No    4. Pulmonary Hypertension: No    5. Aortic Stenosis (noncardiac surgery within 30 days of TAVR is acceptable risk to proceed without delay): No      RCRI:  Elevated Risk Surgery- No  History of Ischemic Heart Disease- No  History of Congestive Heart Failure- No  History of Cerebrovascular Disease- Yes, describe: TIA  Pre-Operative Treatment with Insulin- No  Preoperative Creatinine >2mg/dL- No    Patient score:    30 day risk of death, MI, or cardiac arrest:  1 Point: 6.0%    Impression:    Given the urgency of surgical intervention and review of the above risk factors, there is not additional work-up necessary from a trauma perspective.       Risks versus benefits of all surgical procedures are based on the surgical and the anesthesia teams' assessments. Patient is medically optimized for surgery from a trauma team perspective at this time. The patient should be admitted to the Trauma Floor with no telemetry postoperatively.    Attending Surgeon: Dr. Dany Osborne MD

## 2025-04-15 NOTE — OP NOTE
INSERTION, RETROGRADE INTRAMEDULLARY MERLINE, FEMUR (L) Operative Note     Date: 4/15/2025  OR Location: [facility] OR    Name: Kristi Pat YOB: 1960, Age: 64 y.o., MRN: [id number], Sex: female    Diagnosis  Pre-op Diagnosis      * Closed fracture of distal end of left femur, unspecified fracture morphology, initial encounter [S72.402A] Post-op Diagnosis     * Closed fracture of distal end of left femur, unspecified fracture morphology, initial encounter [S72.402A]     Procedures  Open reduction and intramedullary nailing left femur  -Given the underlying poor bone quality as well as the existing malunion the overall complexity and operative time necessary for this procedure were increased    Application of skeletal traction under anesthesia    Surgeons      * Harvinder Tyler - Primary    Resident/Fellow/Other Assistant:  Surgeons and Role:     * Melinda Villalpando DO - Resident - Assisting     * Moise Shepard MD - Fellow    Dr. Shepard participated in this case as the assistant, performing components of the positioning, approach, debridement, reduction, fixation, and closure. Due to the nature and complexity of the case, no qualified resident of an appropriate level was available to assist.    Staff:   Circulator: Daniel  Scrub Person: Alonso    Anesthesia Staff: Anesthesiologist: Ervin Richardson DO  C-AA: DANIEL Rodriguez; DANIEL Mejia    Procedure Summary  Anesthesia: General  ASA: III  Estimated Blood Loss: 300mL  Intra-op Medications:   Administrations occurring from 0650 to 0915 on 04/15/25:   Medication Name Total Dose   sodium chloride 0.9 % irrigation solution 1,000 mL   albumin human bottle 5% 500 mL   ceFAZolin (Ancef) vial 1 g 2 g   dexAMETHasone (Decadron) injection 4 mg/mL 8 mg   fentaNYL (Sublimaze) injection 50 mcg/mL 100 mcg   glycopyrrolate (Robinul) injection 0.2 mg/mL 0.2 mg   heparin bolus from bag 4,400 Units Cannot be calculated   LR bolus Cannot be calculated    lidocaine (Xylocaine) injection 2 % 80 mg   midazolam PF (Versed) injection 1 mg/mL 2 mg   phenylephrine 40 mcg/mL syringe 10 mL 1,000 mcg   propofol (Diprivan) injection 10 mg/mL 100 mg   rocuronium (ZeMuron) 50 mg/5 mL injection 100 mg   tranexamic acid 1,000 mg/100 mL NS (premix) 1,000 mg              Anesthesia Record               Intraprocedure I/O Totals          Intake    LR bolus 1000.00 mL    Tranexamic Acid 0.00 mL    The total shown is the total volume documented since Anesthesia Start was filed.    albumin human bottle 5% 500.00 mL    Total Intake 1500 mL       Output    Est. Blood Loss 300 mL    Total Output 300 mL       Net    Net Volume 1200 mL          Specimen: No specimens collected              Drains and/or Catheters: * None in log *    Tourniquet Times:         Implants:  Implants       Type Name Action Serial No.      Joint GUIDE WIRE, JACQUELINE, 3.0MM X 1000MM - [id number] Implanted      Screw WIRE, DREW 3 X 285 - [id number] Implanted       retrograde nail [id number] Implanted      Screw SCREW, ADVANCED LOCKING, 5 X 80MM - [id number] Implanted      Screw SCREW, ADVANCED LOCKING, 5 X 65MM - [id number] Implanted      Screw SCREW, ADVANCED LOCKING, 5 X 80MM - [id number] Implanted      Screw SCREW, ADVANCED LOCKING, 5 X 65MM - [id number] Implanted      Screw SCREW, LOCKING, 5 X 52.5MM - [id number] Implanted      Screw SCREW, ADVANCED LOCKING, 5 X 50MM - [id number] Implanted      Screw SCREW, LOCKING, 5 X 37.5MM - [id number] Implanted      Screw SCREW, ADVANCED LOCKING, 5 X 37.5MM - [id number] Implanted      Screw END CAP, SCN, FULLY THREADED, T2 - [id number] Implanted               Findings: Closed, displaced left distal third femoral shaft fracture    Indications: Kristi Pat is an 64 y.o. female who is having surgery for Closed fracture of distal end of left femur, unspecified fracture morphology, initial encounter [S72.402A].  Patient has a history of multiple sclerosis and has been  nonambulatory for several years who sustained the above-noted injury approximately 2 weeks ago while transferring.  There were no other significant injuries as a result of the incident.  Operative versus nonoperative management was discussed with patient who was in agreement with surgical management of this injury.    The patient was seen in the preoperative area. The risks, benefits, complications, treatment options, non-operative alternatives, expected recovery and outcomes were discussed with the patient. The possibilities of reaction to medication, pulmonary aspiration, injury to surrounding structures, bleeding, recurrent infection, the need for additional procedures, failure to diagnose a condition, and creating a complication requiring transfusion or operation were discussed with the patient. The patient concurred with the proposed plan, giving informed consent.  The site of surgery was properly noted/marked if necessary per policy. The patient has been actively warmed in preoperative area. Preoperative antibiotics have been ordered and given within 1 hours of incision. Venous thrombosis prophylaxis have been ordered including unilateral sequential compression device    Procedure Details: The patient was subsequently taken back to the operating room where an initial timeout was performed including the patient's name date of birth,, procedure to be performed, which were agreed upon by the entire surgical staff.  Patient was then intubated in the supine position as per protocol.  She was transferred to the operating table with where she was maintained in the supine position with all bony prominences well-padded.  A bump was placed under the ipsilateral hip.  She was then prepped and draped in usual sterile fashion.    We began by first utilizing fluoroscopy to identify an appropriate position for our planned proximal tibial traction.  After appropriate location was confirmed, a 2.0 mm Steinmann pin was inserted  from lateral to medial.  A rope was attached to this and weights were hung off of the end of the bed.    We then turned our attention to intramedullary nailing of the distal femur fracture.  We marked out the tibial tubercle as well as the borders of the patella.  Patient was noted to have significant patella Baha and an anterior incision was marked out from the tibial tubercle to approximately 2 cm proximal to the superior pole of the patella.  Utilizing a 10 blade the above-noted incision was made through skin.  Electrocautery was utilized to dissect the deeper tissues.  We then performed a medial parapatellar arthrotomy.  We inserted our starting guidewire and placed this in appropriate position.  Guidepin was advanced.  Opening reamer was used over top of this.  We identified the fracture under fluoroscopic guidance and made an approximately 6 cm incision over the lateral aspect through the IT band.  We then debrided the fracture edges.  There was significant scarring secondary to the patient's prior injury.  Once we able to gain access to the fracture fragments we applied a standard reduction forceps.  Along with the standard reduction forceps manual reduction maneuvers were performed and we were able to obtain an appropriate reduction.  We were cautious in our approach given the patient's significant osteopenia/osteoporosis.  Our overall reduction was adequate but difficult to assess secondary to the patient's underlying deformity from her previous injury.  Reduction maintained satisfactory at this time.  Once the nail was seated we placed 6 distal interlock screws using the aiming jig.  The most distal interlocking screw was a regular screw, however, the 5 other distal interlocking screws were all advanced lockers.  We placed 2 proximal anterior to posterior interlock screws using the aiming arm.  These were placed bicortically and had adequate purchase.  A setscrew was applied and the insertion handle was  removed.  Traction weights were removed at this time and the traction pin was removed by hand without difficulty.  We obtained our final fluoroscopic images which demonstrated satisfactory fracture reduction as well as safe extra-articular hardware placement.  We copiously irrigated all incisions with normal saline.  The patellar tendon was closed with a running #1 vicryl suture.  The deep dermal layer and remaining incisions were closed in a layered fashion.  Sterile dressings were applied.  Taken down the counts were all correct x 2.  Patient was awakened in the supine position as per anesthesia protocol.  She was taken to PACU in stable condition.    Patient will be weightbearing as tolerated on the operative extremity.  She is to receive IV antibiotics 24 hours while in house.  Recommend 81 mg oral aspirin twice daily for DVT prophylaxis.  She will return to our clinic in 2 to 3 weeks for repeat evaluation, staple removal, repeat radiographs of the left femur including AP and a lateral.    Complications:  None; patient tolerated the procedure well.    Disposition: PACU - hemodynamically stable.  Condition: stable       Attending Attestation: I was present for the entire procedure.    Harvinder Tyler  Phone Number: [phone]

## 2025-04-15 NOTE — DISCHARGE INSTRUCTIONS
Follow-Up Instructions  You will need to be seen in clinic by Dr. Tyler in 2 weeks for a post-operative evaluation.      You will need to call and schedule an appointment, unless there is a previous appointment that appears on your discharge instructions.  The direct orthopaedic clinic appointment line phone number is [phone].  Please do not delay in calling to make this appointment.    You should also follow up with your primary care provider in 1-2 weeks.    Activity Restrictions  1) No driving until further instructed by your orthopaedic physician, which will be addressed at your outpatient appointments.    2) No driving or operating heavy machinery while taking narcotic pain medication.    3) Weight bearing status --> Weight bearing as tolerated Left lower extremity.     Wound care instructions:   1) Leave operative dressing (Mepilex) in place until POD7, keep staples in place until postoperative visit with Dr. Tyler. Then remove and leave incision open to air. Let water run freely over incision when showering, do not scrub. Do not soak in pool or tub.    2) Call if any drainage after 7 days, increased redness/warmth/swelling at incision site, abnormal pain/tenderness of the extremity, abnormal swelling of the extremity that does not respond to elevation, SOB/chest pain.     Additionally, you are being prescribed Eliquis 10 mg BID (twice daily) for 7 days, followed by Relinquish 5 mg BID for 23 days for your diagnoses of DVT.  Please follow up with your primary care at discharge for management of your DVT and regarding your recent hospital stay.

## 2025-04-15 NOTE — ANESTHESIA PROCEDURE NOTES
Airway  Date/Time: 4/15/2025 7:36 AM  Urgency: elective    Airway not difficult    Staffing  Performed: DANIEL   Authorized by: Ervin Richardson DO    Performed by: DANIEL Mejia  Patient location during procedure: OR    Indications and Patient Condition  Indications for airway management: anesthesia  Spontaneous ventilation: present  Sedation level: deep  Preoxygenated: no  Patient position: sniffing  MILS maintained throughout  Mask difficulty assessment: 1 - vent by mask    Final Airway Details  Final airway type: endotracheal airway      Successful airway: ETT  Cuffed: yes   Successful intubation technique: direct laryngoscopy  Facilitating devices/methods: intubating stylet  Endotracheal tube insertion site: oral  Blade: Christopher  Blade size: #3  ETT size (mm): 6.5  Cormack-Lehane Classification: grade IIb - view of arytenoids or posterior of glottis only  Placement verified by: chest auscultation and capnometry   Measured from: gums  ETT to gums (cm): 21  Number of attempts at approach: 1    Additional Comments  Atraumatic intubation, dentition in tact. Medical student attempted intubation first, successful pass by Geovany MOSES

## 2025-04-15 NOTE — ANESTHESIA PREPROCEDURE EVALUATION
Patient: Kristi Pat    Procedure Information       Date/Time: 04/15/25 0650    Procedure: INSERTION, RETROGRADE INTRAMEDULLARY MERLINE, FEMUR (Left: Thigh - Leg Upper) - Deirdre T2 alpha    Location: [facility] OR 01 / [facility]    Surgeons: Harvinder Tyler MD            Relevant Problems   Cardiac   (+) Hyperlipemia   (+) Hyperlipidemia      Neuro   (+) Multiple sclerosis (Multi)   (+) TIA (transient ischemic attack)   (+) Transient ischemic attack      /Renal   (+) Kidney stones   (+) UTI (urinary tract infection)      Endocrine   (+) Acquired hypothyroidism      Hematology   (+) Normocytic anemia      ID   (+) Pseudomonas (aeruginosa) (mallei) (pseudomallei) as the cause of diseases classified elsewhere   (+) UTI (urinary tract infection)       Clinical information reviewed:   Tobacco  Allergies    Med Hx  Surg Hx   Fam Hx  Soc Hx        NPO Detail:  NPO/Void Status  Date of Last Liquid: 04/14/25  Date of Last Solid: 04/14/25         Physical Exam    Airway  Mallampati: III  TM distance: <3 FB  Neck ROM: full     Cardiovascular - normal exam  Rhythm: regular  Rate: normal     Dental    Pulmonary - normal exam  Breath sounds clear to auscultation     Abdominal            Anesthesia Plan    History of general anesthesia?: yes  History of complications of general anesthesia?: no    ASA 3     general     The patient is not a current smoker.  Patient did not smoke on day of procedure.    intravenous induction   Postoperative administration of opioids is intended.  Anesthetic plan and risks discussed with patient.  Use of blood products discussed with patient who consented to blood products.    Plan discussed with attending.

## 2025-04-15 NOTE — ANESTHESIA POSTPROCEDURE EVALUATION
Patient: Kristi Pat    Procedure Summary       Date: 04/15/25 Room / Location: [facility] OR 01 / [facility]    Anesthesia Start: 0719 Anesthesia Stop: 1024    Procedure: INSERTION, RETROGRADE INTRAMEDULLARY MERLINE, FEMUR (Left: Thigh - Leg Upper) Diagnosis:       Closed fracture of distal end of left femur, unspecified fracture morphology, initial encounter      (Closed fracture of distal end of left femur, unspecified fracture morphology, initial encounter [S72.402A])    Surgeons: Harvinder Tyler MD Responsible Provider: Ervin Richardson DO    Anesthesia Type: general ASA Status: 3            Anesthesia Type: general    Vitals Value Taken Time   /53 04/15/25 1548   Temp 36 °C (96.8 °F) 04/15/25 1545   Pulse 94 04/15/25 1555   Resp 13 04/15/25 1554   SpO2 99 % 04/15/25 1555   Vitals shown include unfiled device data.    Anesthesia Post Evaluation    Patient location during evaluation: PACU  Patient participation: complete - patient participated  Level of consciousness: awake  Pain management: adequate  Airway patency: patent  Cardiovascular status: acceptable  Respiratory status: acceptable  Hydration status: acceptable  Postoperative Nausea and Vomiting: none        No notable events documented.

## 2025-04-15 NOTE — H&P
[facility] Department of Orthopaedic Surgery   Surgical History & Physical <30 Days    History & Physical Reviewed:  H&P reviewed. The patient was examined and there are no changes to the H&P. Patient electing to proceed with surgery. Patient consented and posted. Relevant findings and updates are noted below:  No significant changes.    Home medications were reviewed with significant updates noted below:  No significant changes.      04/15/25 at 3:30 AM - Farzaneh Chapman MD

## 2025-04-16 LAB
ALBUMIN SERPL BCP-MCNC: 3.4 G/DL (ref 3.4–5)
ANION GAP SERPL CALC-SCNC: 12 MMOL/L (ref 10–20)
BUN SERPL-MCNC: 17 MG/DL (ref 6–23)
CALCIUM SERPL-MCNC: 8.3 MG/DL (ref 8.6–10.6)
CHLORIDE SERPL-SCNC: 109 MMOL/L (ref 98–107)
CO2 SERPL-SCNC: 24 MMOL/L (ref 21–32)
CREAT SERPL-MCNC: 0.68 MG/DL (ref 0.5–1.05)
EGFRCR SERPLBLD CKD-EPI 2021: >90 ML/MIN/1.73M*2
ERYTHROCYTE [DISTWIDTH] IN BLOOD BY AUTOMATED COUNT: 17.1 % (ref 11.5–14.5)
ERYTHROCYTE [DISTWIDTH] IN BLOOD BY AUTOMATED COUNT: 17.2 % (ref 11.5–14.5)
GLUCOSE SERPL-MCNC: 107 MG/DL (ref 74–99)
HCT VFR BLD AUTO: 23.4 % (ref 36–46)
HCT VFR BLD AUTO: 29.7 % (ref 36–46)
HGB BLD-MCNC: 6.9 G/DL (ref 12–16)
HGB BLD-MCNC: 9.3 G/DL (ref 12–16)
MAGNESIUM SERPL-MCNC: 1.81 MG/DL (ref 1.6–2.4)
MCH RBC QN AUTO: 29.1 PG (ref 26–34)
MCH RBC QN AUTO: 30.3 PG (ref 26–34)
MCHC RBC AUTO-ENTMCNC: 29.5 G/DL (ref 32–36)
MCHC RBC AUTO-ENTMCNC: 31.3 G/DL (ref 32–36)
MCV RBC AUTO: 97 FL (ref 80–100)
MCV RBC AUTO: 99 FL (ref 80–100)
NRBC BLD-RTO: 0 /100 WBCS (ref 0–0)
NRBC BLD-RTO: 0 /100 WBCS (ref 0–0)
PHOSPHATE SERPL-MCNC: 2.8 MG/DL (ref 2.5–4.9)
PLATELET # BLD AUTO: 153 X10*3/UL (ref 150–450)
PLATELET # BLD AUTO: 175 X10*3/UL (ref 150–450)
POTASSIUM SERPL-SCNC: 4.1 MMOL/L (ref 3.5–5.3)
RBC # BLD AUTO: 2.37 X10*6/UL (ref 4–5.2)
RBC # BLD AUTO: 3.07 X10*6/UL (ref 4–5.2)
SODIUM SERPL-SCNC: 141 MMOL/L (ref 136–145)
UFH PPP CHRO-ACNC: 0.4 IU/ML
UFH PPP CHRO-ACNC: 0.7 IU/ML (ref ?–1.1)
WBC # BLD AUTO: 6.5 X10*3/UL (ref 4.4–11.3)
WBC # BLD AUTO: 6.8 X10*3/UL (ref 4.4–11.3)

## 2025-04-16 PROCEDURE — 36430 TRANSFUSION BLD/BLD COMPNT: CPT

## 2025-04-16 PROCEDURE — 2500000001 HC RX 250 WO HCPCS SELF ADMINISTERED DRUGS (ALT 637 FOR MEDICARE OP)

## 2025-04-16 PROCEDURE — 97530 THERAPEUTIC ACTIVITIES: CPT | Mod: GP

## 2025-04-16 PROCEDURE — 2500000004 HC RX 250 GENERAL PHARMACY W/ HCPCS (ALT 636 FOR OP/ED)

## 2025-04-16 PROCEDURE — 85520 HEPARIN ASSAY: CPT

## 2025-04-16 PROCEDURE — 97161 PT EVAL LOW COMPLEX 20 MIN: CPT | Mod: GP

## 2025-04-16 PROCEDURE — 80069 RENAL FUNCTION PANEL: CPT

## 2025-04-16 PROCEDURE — 36415 COLL VENOUS BLD VENIPUNCTURE: CPT

## 2025-04-16 PROCEDURE — 2500000002 HC RX 250 W HCPCS SELF ADMINISTERED DRUGS (ALT 637 FOR MEDICARE OP, ALT 636 FOR OP/ED)

## 2025-04-16 PROCEDURE — P9016 RBC LEUKOCYTES REDUCED: HCPCS

## 2025-04-16 PROCEDURE — 85027 COMPLETE CBC AUTOMATED: CPT

## 2025-04-16 PROCEDURE — 85027 COMPLETE CBC AUTOMATED: CPT | Performed by: HEALTH CARE PROVIDER

## 2025-04-16 PROCEDURE — 83735 ASSAY OF MAGNESIUM: CPT

## 2025-04-16 PROCEDURE — 1100000001 HC PRIVATE ROOM DAILY

## 2025-04-16 RX ADMIN — ATORVASTATIN CALCIUM 40 MG: 40 TABLET, FILM COATED ORAL at 09:40

## 2025-04-16 RX ADMIN — NITROFURANTOIN MONOHYDRATE/MACROCRYSTALS 100 MG: 25; 75 CAPSULE ORAL at 09:40

## 2025-04-16 RX ADMIN — POLYETHYLENE GLYCOL 3350 17 G: 17 POWDER, FOR SOLUTION ORAL at 09:40

## 2025-04-16 RX ADMIN — BACLOFEN 10 MG: 10 TABLET ORAL at 16:36

## 2025-04-16 RX ADMIN — CEFAZOLIN SODIUM 2 G: 2 INJECTION, SOLUTION INTRAVENOUS at 00:00

## 2025-04-16 RX ADMIN — BACLOFEN 10 MG: 10 TABLET ORAL at 06:07

## 2025-04-16 RX ADMIN — BACLOFEN 10 MG: 10 TABLET ORAL at 20:31

## 2025-04-16 RX ADMIN — BACLOFEN 10 MG: 10 TABLET ORAL at 12:42

## 2025-04-16 RX ADMIN — ASPIRIN 81 MG: 81 TABLET, COATED ORAL at 09:40

## 2025-04-16 ASSESSMENT — COGNITIVE AND FUNCTIONAL STATUS - GENERAL
WALKING IN HOSPITAL ROOM: TOTAL
WALKING IN HOSPITAL ROOM: A LITTLE
STANDING UP FROM CHAIR USING ARMS: A LITTLE
MOVING TO AND FROM BED TO CHAIR: A LITTLE
MOBILITY SCORE: 18
MOVING FROM LYING ON BACK TO SITTING ON SIDE OF FLAT BED WITH BEDRAILS: A LOT
MOBILITY SCORE: 8
STANDING UP FROM CHAIR USING ARMS: TOTAL
MOVING FROM LYING ON BACK TO SITTING ON SIDE OF FLAT BED WITH BEDRAILS: A LITTLE
DRESSING REGULAR LOWER BODY CLOTHING: A LITTLE
CLIMB 3 TO 5 STEPS WITH RAILING: A LITTLE
DAILY ACTIVITIY SCORE: 21
TOILETING: A LITTLE
MOVING TO AND FROM BED TO CHAIR: TOTAL
TURNING FROM BACK TO SIDE WHILE IN FLAT BAD: A LITTLE
HELP NEEDED FOR BATHING: A LITTLE
TURNING FROM BACK TO SIDE WHILE IN FLAT BAD: A LOT
CLIMB 3 TO 5 STEPS WITH RAILING: TOTAL

## 2025-04-16 ASSESSMENT — ACTIVITIES OF DAILY LIVING (ADL)
LACK_OF_TRANSPORTATION: NO
ADL_ASSISTANCE: NEEDS ASSISTANCE

## 2025-04-16 ASSESSMENT — PAIN - FUNCTIONAL ASSESSMENT
PAIN_FUNCTIONAL_ASSESSMENT: 0-10
PAIN_FUNCTIONAL_ASSESSMENT: 0-10

## 2025-04-16 ASSESSMENT — PAIN SCALES - GENERAL
PAINLEVEL_OUTOF10: 0 - NO PAIN

## 2025-04-16 NOTE — PROGRESS NOTES
Occupational Therapy                 Therapy Communication Note    Patient Name: Kristi Pat  MRN: [id number]  Department: [place] [address]  Room: [id number]  Today's Date: 4/16/2025     Discipline: Occupational Therapy    OT Missed Visit: Yes     Missed Visit Reason: Missed Visit Reason:  (HH low pt asked to return later will re att time permitting)    Missed Time: Attempt 9:38 am    Comment:

## 2025-04-16 NOTE — PROGRESS NOTES
"Orthopaedic Surgery Progress Note    S:    Evaluated post-operatively. Pain controlled on current regimen.  Denies any new onset numbness, tingling or weakness. Denies nausea, vomiting, chest pain, dyspnea, or calf tenderness. Denies any fever or chills. Inquiring why she hasn't been able to eat anything.    O:  /60 (BP Location: Left arm, Patient Position: Lying)   Pulse 92   Temp 36.7 °C (98 °F) (Temporal)   Resp 15   Ht 1.753 m (5' 9\")   Wt 54.4 kg (120 lb)   SpO2 97%   BMI 17.72 kg/m²     GEN - NAD, resting comfortably in hospital bed  HEENT - MMM, EOMI, NCAT   CV - RRR by peripheral palpation, limbs wwp  PULM - NWOB on RA  ABD - Non-distended  NEURO - MCCONNELL spontaneously, CNs II - XII grossly intact  PSYCH - Appropriate mood and affect    MSK:  LLE:   - Post-operative dressings (staples, Mepilex x4, ACE) c/d/i  - Motor intact in DF/PF/EHL/FHL  - SILT in saph/sural/SPN/DPN distributions  - Foot wwp, 2+ DP/PT pulse, brisk cap refill  - Compartments soft and compressible, no pain with passive dorsiflexion     A/P: 64 y.o. female s/p L femur IMN on 4/15 with Dr. Tyler.    Plan:  - Weight bearing: WBAT LLE  - Dressing: staples, Mepilex x4, ACE  - DVT ppx: SCDs, okay for chemo PPx per primary;  recommend at least ASA 81 mg BID for 4 weeks post-op  - Diet: Okay for diet from orthopedic perspective   - Perioperative Antibiotics: 24 hour perioperative ancef   - Please send with Calcium (as carbonate)-Vitamin D 600mg-400IU PO BID for 30 days upon discharge   - Drain: none  - Post-operative Imaging: None   - No plans to return to the OR with orthopedic surgery this admission.     Orthopaedics will follow peripherally while patient is in house. Recommend:  - Pt will need to be WBAT LLE until first follow up appointment.   - Patient can be continued on normal anticoagulation regimen from an orthopedics perspective, recommend at least 4 weeks  - Dressing can be removed in 7 days. Can shower after that.  - We " recommend discharge with calcium(as carbonate)-VitD 600mg-400IU (10mcg) BID x30d  - Patient should follow up w/ Dr. Tyler in 1-2 weeks after discharge for post-operative appointment (patient may call [phone] to schedule).     Please page with questions      This plan was discussed with the attending, Dr. Tyler.    Gaetano Cheek MD  Orthopaedic Surgery PGY-2  [facility]     While admitted, this patient will be followed by the Ortho Trauma Team. Please contact below residents with any questions (available via Epic Chat).      First call: John Flanagan, PGY-1  Second call: Gaetano Cheek, PGY-2  Third call: Lzuma Ross, PGY-3    From 6pm-7am, weekends, holidays, and if no answer and there is an emergent issue, please page orthopaedic consult pager, [phone].

## 2025-04-16 NOTE — PROGRESS NOTES
Physical Therapy    Physical Therapy Evaluation and Treatment    Patient Name: Kristi Pat  MRN: [id number]  Department: [place] [address]  Room: [id number]  Today's Date: 4/16/2025   Time Calculation  Start Time: 1637  Stop Time: 1712  Time Calculation (min): 35 min    Assessment/Plan   PT Assessment  PT Assessment Results: Decreased strength, Decreased endurance, Impaired balance, Decreased mobility  Rehab Prognosis: Good  Barriers to Discharge Home: Physical needs  Physical Needs: 24hr ADL assistance needed, 24hr mobility assistance needed, High falls risk due to function or environment  Evaluation/Treatment Tolerance: Patient tolerated treatment well  Medical Staff Made Aware: Yes  Strengths: Support of Caregivers, Insight into problems, Housing layout  Barriers to Participation: Premorbid level of function  End of Session Communication: Bedside nurse  Assessment Comment: Prior to admission, spouse would assist with all bed mobility (modA) and use a power stander to transfer patient to wheelchair/toilet/shower chair. Patient has hip wounds, but can tilt in space to off weight. Patient was able to transfer to the edge of the bed with PT giving spouse/patient education on technique with ways to reduce shear forces. Patient's spouse reports that it was similar to her baseline and that both patient/spouse report feeling safe/successful going home. Patient does have a high fear of falling and is very nervous using the equipment at home. Patient also has high difficulty rolling to offweight wounds/pressure points. Based on patient's presentation, equipment at home, and spousal skill level, l;ow intenisty rehab is recommended. Patient would benefit from continued focus to imporve rolling/bed mobility to offweight wounds and reduce caregiver burden while addressing fear of falling/equipment.  End of Session Patient Position: Bed, 3 rail up, Alarm off, caregiver present  IP OR SWING BED PT PLAN  Inpatient or Swing Bed:  Inpatient  PT Plan  Treatment/Interventions: Bed mobility, Transfer training, Gait training, Stair training, Balance training, Neuromuscular re-education, Neurodevelopmental intervention, Strengthening, Endurance training, Range of motion, Therapeutic exercise, Therapeutic activity, Home exercise program  PT Plan: Ongoing PT  PT Frequency: 3 times per week  PT Discharge Recommendations: Low intensity level of continued care  PT Recommended Transfer Status: Assist x2  PT - OK to Discharge: Yes    Subjective   General Visit Information:  General  Reason for Referral: Patient with injury at home resulting in left distal femur fracture with IM miley in femur 4/15.  Past Medical History Relevant to Rehab: MS, left femur fracture, TIA, UTI, urinary incontinence  Family/Caregiver Present: Yes  Caregiver Feedback: Spouse present and supportive  Prior to Session Communication: Bedside nurse  Patient Position Received: Bed, 2 rail up, Alarm on  General Comment: Patient open to working with PT  Home Living:  Home Living  Type of Home: Independent living  Lives With: Spouse  Home Adaptive Equipment: Wheelchair-power, Scooter  Home Layout: One level, Able to live on main level with bedroom/bathroom  Home Access: Ramped entrance  Bathroom Shower/Tub: Walk-in shower  Home Living Comments: Full equipment setup  Prior Level of Function:  Prior Function Per Pt/Caregiver Report  Level of [place]: Needs assistance with ADLs, Needs assistance with homemaking, Needs assistance with functional transfers  Receives Help From: Primary caregiver  ADL Assistance: Needs assistance  Homemaking Assistance: Needs assistance  Ambulatory Assistance: Needs assistance  Prior Function Comments: Patient + spouse report that patient was mod assist to get out of bed and uses a stander (power) to perform stand pivot transfers to toilet, shower, or wheelchair. Once in wheelchair she is indepedent in navigation.  Precautions:  Precautions  LE Weight  "Bearing Status: Weight Bearing as Tolerated  Medical Precautions: Fall precautions  Precautions Comment: WBAT left LE     Treatments:    Please see \"Functional Assessment\" for specifics regarding treatment during this evaluation. Patient given education on how to safely mobilize with regard to equipment and precautions. Extra repetitions and cuing given to maximize independence.          Objective   Pain:  Pain Assessment  Pain Assessment: 0-10  0-10 (Numeric) Pain Score: 0 - No pain  Cognition:  Cognition  Overall Cognitive Status: Within Functional Limits  Arousal/Alertness: Appropriate responses to stimuli  Orientation Level: Oriented X4  Following Commands: Follows all commands and directions without difficulty  Attention: Within Functional Limits  Insight: Within function limits  Impulsive: Within functional limits  Processing Speed: Within funtional limits    General Assessments:  Activity Tolerance  Endurance: Decreased tolerance for upright activites    Sensation  Light Touch:  (\"I feel nothing below my legs. I have no pain\")    Strength  Strength Comments: Gross functional weakness. Able to lift heel off bed ~1/2 inch both legs.  Strength  Strength Comments: Gross functional weakness. Able to lift heel off bed ~1/2 inch both legs.    Perception  Inattention/Neglect: Appears intact      Coordination  Movements are Fluid and Coordinated: Yes    Postural Control  Postural Control: Impaired    Static Sitting Balance  Static Sitting-Balance Support: Feet supported, Bilateral upper extremity supported  Static Sitting-Level of Assistance: Contact guard  Dynamic Sitting Balance  Dynamic Sitting-Balance Support: Feet supported, Left upper extremity supported  Dynamic Sitting-Level of Assistance: Minimum assistance       Functional Assessments:  Bed Mobility  Bed Mobility: Yes  Bed Mobility 1  Bed Mobility 1: Supine to sitting, Sitting to supine  Level of Assistance 1: Maximum assistance  Bed Mobility Comments 1: " Hooked left leg with right toes to assist in bringing legs over the bed. Got out of bed on patient's left.  Extremity/Trunk Assessments:  RLE   RLE :  (Increased tone, but able to fully extend knee and bend knee to 90 degrees to sit)  LLE   LLE :  (Leg in ace wrap, but still able to fully extend and flex anya to 90 degrees)  Outcome Measures:  [facility] Basic Mobility  Turning from your back to your side while in a flat bed without using bedrails: A lot  Moving from lying on your back to sitting on the side of a flat bed without using bedrails: A lot  Moving to and from bed to chair (including a wheelchair): Total  Standing up from a chair using your arms (e.g. wheelchair or bedside chair): Total  To walk in hospital room: Total  Climbing 3-5 steps with railing: Total  Basic Mobility - Total Score: 8    Encounter Problems       Encounter Problems (Active)       PT Problem       Patient able to transfer from supine to sitting edge of bed requiring minimal assist or less  (Progressing)       Start:  04/16/25    Expected End:  04/30/25            Patient is able to roll side to side requiring minimal assistance or less (Progressing)       Start:  04/16/25    Expected End:  04/30/25            Patient's spouse is able to safely transfer patient to edge of bed with good technique and minimal risk for back strain (Progressing)       Start:  04/16/25    Expected End:  04/30/25                   Education Documentation  Precautions, taught by Edilson Adrian PT at 4/16/2025  5:35 PM.  Learner: Family, Patient  Readiness: Acceptance  Method: Demonstration, Explanation  Response: Verbalizes Understanding, Demonstrated Understanding  Comment: Goals of PT, precautions, family mobility training, offweighting pressure injuries education    Body Mechanics, taught by Edilson Adrian PT at 4/16/2025  5:35 PM.  Learner: Family, Patient  Readiness: Acceptance  Method: Demonstration, Explanation  Response: Verbalizes Understanding,  Demonstrated Understanding  Comment: Goals of PT, precautions, family mobility training, offweighting pressure injuries education    Home Exercise Program, taught by Edilson Adrian PT at 4/16/2025  5:35 PM.  Learner: Family, Patient  Readiness: Acceptance  Method: Demonstration, Explanation  Response: Verbalizes Understanding, Demonstrated Understanding  Comment: Goals of PT, precautions, family mobility training, offweighting pressure injuries education    Mobility Training, taught by Edilson Adrian PT at 4/16/2025  5:35 PM.  Learner: Family, Patient  Readiness: Acceptance  Method: Demonstration, Explanation  Response: Verbalizes Understanding, Demonstrated Understanding  Comment: Goals of PT, precautions, family mobility training, offweighting pressure injuries education    Education Comments  No comments found.

## 2025-04-16 NOTE — PROGRESS NOTES
04/16/25 1121   Discharge Planning   Living Arrangements Spouse/significant other   Support Systems Spouse/significant other   Assistance Needed Patients  assists with daily care needs, and  ADLs.   Type of Residence Private residence   Number of Stairs to Enter Residence 0   Number of Stairs Within Residence 6   Do you have animals or pets at home? No   Who is requesting discharge planning? Provider   Home or Post Acute Services In home services   Type of Home Care Services Home OT;Home PT   Expected Discharge Disposition Home H   Does the patient need discharge transport arranged? No   RoundTrip coordination needed? No   Financial Resource Strain   How hard is it for you to pay for the very basics like food, housing, medical care, and heating? Not hard   Housing Stability   In the last 12 months, was there a time when you were not able to pay the mortgage or rent on time? N   In the past 12 months, how many times have you moved where you were living? 0   At any time in the past 12 months, were you homeless or living in a shelter (including now)? N   Transportation Needs   In the past 12 months, has lack of transportation kept you from medical appointments or from getting medications? no   In the past 12 months, has lack of transportation kept you from meetings, work, or from getting things needed for daily living? No     Met with pt and introduced myself as Care Coordinator with Care Transitions Team for Discharge Planning. Pt stated she feels safe at home. Pts  drives to [facility] appts, assists with transfers, cooking and cleaning.  Pt's address, phone number and contact information was verified. Pt denies any social or financial concerns at this time.  Home Healthcare: no active home care, pt denies a need. Stated her and her  are managing her care needs at home.   DME: yovanny lift, shower chair, grab bars, wheelchair, walker, cane, raised toilet seat.  PCP: Rhonda Qureshi, last appt 4 weeks ago.    Pharmacy:  [facility].    Transitional Care Coordination Progress Note:  Plan per Medical/Surgical team: s/p L femur IMN on 4/15.   Discharge Disposition: Pending PT/OT recommendations.   Potential Barriers: none.   Patient Class: Inpatient  Financial Class: [employer]  ADOD: 4/18    Stephanie Stein RN, BSN  Transitional Care Coordinator  Office: [phone]  Secure chat via Haiku

## 2025-04-16 NOTE — CONSULTS
Wound Care Consult     Visit Date: 4/16/2025      Patient Name: Kristi Pat         MRN: [id number]           YOB: 1960     Reason for Consult: Right buttock pressure injury        Wound History: 64 y.o. female presenting as a transfer from [facility] for left distal femur fx and left leg DVT. Patient reports that 2 weeks ago she was using her lift at home to get out of a chair when her left leg got caught and twisted. She felt an intense pain above her left knee at the time with subsequent swelling/pain. The pain resolved within a few days and the swelling has diminished according to the patient.  Past medical history of multiple sclerosis on methotrexate and baclofen. Also was found to have a concomitant DVT of the left leg at OSH (per HPI by Dr. Norm Osborne on 4/15/2025)     Pertinent Labs:   Albumin   Date Value Ref Range Status   04/16/2025 3.4 3.4 - 5.0 g/dL Final       Wound Assessment:  Wound 04/15/25 Pressure Injury Buttock Right (Active)   Present on Admission to Healthcare Facility Y 04/16/25 1228   Wound Image   04/16/25 1228   Site Assessment Red;Pink 04/16/25 1228   Yarelis-Wound Assessment Hyperpigmented;Fragile 04/16/25 1228   Non-staged Wound Description Full thickness 04/16/25 1228   Pressure Injury Stage 3 04/16/25 1228   Shape see image 04/15/25 1751   Wound Length (cm) 3.5 cm 04/16/25 1228   Wound Width (cm) 2.5 cm 04/16/25 1228   Wound Surface Area (cm^2) 6.87 cm^2 04/16/25 1228   Wound Depth (cm) 0.8 cm 04/16/25 1228   Wound Volume (cm^3) 3.665 cm^3 04/16/25 1228   Wound Healing % 39 04/16/25 1228   Undermining 0.5 cm 04/16/25 1228   Undermining Clock Position of Wound 6 04/16/25 1228   Margins Well-defined edges 04/16/25 1228   Drainage Description Serosanguineous 04/16/25 1228   Drainage Amount Scant 04/16/25 1228   Dressing Foam 04/16/25 1228   Dressing Changed Changed 04/16/25 1228   Dressing Status Clean;Dry 04/16/25 1228     Wound Team Summary Assessment:   Pt with  chronic wound to Right hip/ischium. White fibrous tissue noted to wound bed, other areas red and moist. Patient and  state outpatient wound care clinic has been applying collagen sheet (Puracol?) to wound bed. Cora collagen available from WOD team, applied to wound after cleaning with Vashe soaked gauze, covered with faom dressing.      Wound Team Plan:   Will follow while admitted, nursing to change daily dressing of Cora collagen and Mepilex foam. Please contact WOD team if more Cora collagen needed for dressing change.      SHAYY TORRES RN  4/16/2025  5:16 PM

## 2025-04-16 NOTE — PROGRESS NOTES
[facility]  TRAUMA SERVICE - PROGRESS NOTE    Patient Name: Kristi Pat  MRN: [id number]  Admit Date: 139742  : 1960  AGE: 64 y.o.   GENDER: female  ==============================================================================  MECHANISM OF INJURY:   64 y.o. female presenting as a transfer from [facility] for left distal femur fx and left leg DVT. Patient reports that 2 weeks ago she was using her lift at home to get out of a chair when her left leg got caught and twisted. She felt an intense pain above her left knee at the time with subsequent swelling/pain. The pain resolved within a few days and the swelling has diminished according to the patient.  Past medical history of multiple sclerosis on methotrexate and baclofen.     Also was found to have a concomitant DVT of the left leg at OSH.     LOC (yes/no?): no  Anticoagulant / Anti-platelet Rx? (for what dx?): no  Referring Facility Name (N/A for scene EMR run): [facility]     INJURIES:   subacute distal femur fracture   nonocclusive left femoral vein DVT      OTHER MEDICAL PROBLEMS:  Multiple sclerosis  TIA  Recurrent UTI     INCIDENTAL FINDINGS:  None    PROCEDURES:  4/15: IMN Left Femur    ==============================================================================  TODAY'S ASSESSMENT AND PLAN OF CARE:  L Femur Fx  - WBAT LLE   - Recommend at least ASA 81 mg BID for 4 weeks post-op   - Please send with Calcium (as carbonate)-Vitamin D 600mg-400IU PO BID for 30 days upon discharge   - Dressing can be removed in 7 days. Can shower after that.   - Follow up w/ Dr. Tyler in 1-2 weeks after discharge for post-operative appointment (patient may call [phone] to schedule).   - Multimodal pain regimen  - PT/OT:     # ABLA  - [facility].9 [facility] with increment to 9.2  - Continue to monitor for SxS of anemia and transfuse for Hgb < 7 if symptomatic    # DVT  - Continue Heparin drip after stable  CBC    # Co morbidities   - Continue Macrobid  - Continue Methotrexate  - Continue ASA  - Continue Atorvastatin  - Continue baclofen     GI//FEN  - Diet: Reg  - BR: Miralax  - voiding independently   - Monitor and replete electrolytes for K+ < 4 and Mg+ < 2    DVT ppx:  - On heparin sofia  - SCDs    DISPO: Continue care on nursing floor    Patient discussed with attending, Dr. Little    Total face to face time spent with patient/family of 30 minutes, with >50% of the time spent discussing plan of care/management, counseling/educating on disease processes, explaining results of diagnostic testing.    Sandip Rehman PA-C  Trauma, Critical Care, and Acute Care Surgery  [id number]      ==============================================================================  CHIEF COMPLAINT / OVERNIGHT EVENTS:   None    MEDICAL HISTORY / ROS:  Admission history and ROS reviewed. Pertinent changes as follows:  None    PHYSICAL EXAM:  Heart Rate:  []   Temp:  [36 °C (96.8 °F)-36.7 °C (98 °F)]   Resp:  [7-18]   BP: ()/(53-79)   SpO2:  [97 %-100 %]   Physical Exam  Vitals reviewed.   Constitutional:       Appearance: Normal appearance.   HENT:      Head: Normocephalic and atraumatic.      Right Ear: External ear normal.      Left Ear: External ear normal.      Nose: Nose normal.      Mouth/Throat:      Mouth: Mucous membranes are dry.      Pharynx: Oropharynx is clear.   Eyes:      Extraocular Movements: Extraocular movements intact.      Conjunctiva/sclera: Conjunctivae normal.   Cardiovascular:      Rate and Rhythm: Normal rate.      Pulses: Normal pulses.      Heart sounds: Normal heart sounds.   Pulmonary:      Effort: Pulmonary effort is normal.      Breath sounds: Normal breath sounds.   Abdominal:      General: There is no distension.      Palpations: Abdomen is soft.      Tenderness: There is no abdominal tenderness. There is no guarding.   Musculoskeletal:         General: Swelling and tenderness present.       Cervical back: Normal range of motion. No tenderness.      Comments: LLE: Post-operative dressings (staples, Mepilex x4, ACE) c/d/I, able to wiggle toes and plantar/dorsi flex at ankle. Pulses 2+ with SILT. Compartments soft and compressible.   Skin:     General: Skin is warm and dry.      Capillary Refill: Capillary refill takes less than 2 seconds.   Neurological:      Mental Status: She is alert and oriented to person, place, and time.      GCS: GCS eye subscore is 4. GCS verbal subscore is 5. GCS motor subscore is 6.      Sensory: Sensation is intact.      Motor: Motor function is intact.   Psychiatric:         Mood and Affect: Mood normal.         Behavior: Behavior normal.         IMAGING SUMMARY:  (summary of new imaging findings, not a copy of dictation)  None    LABS:  Results from last 7 days   Lab Units 04/15/25  1727 04/14/25  1231 04/14/25  1018   WBC AUTO x10*3/uL 6.5 5.1  --    [facility] WBC AUTO [place]/uL  --   --  5.0   HEMOGLOBIN g/dL 8.0* 10.2*  --    [facility] HEMOGLOBIN g/dL  --   --  10.3*   HEMATOCRIT % 27.4* 33.6*  --    [facility] HEMATOCRIT %  --   --  33.9*   PLATELETS AUTO x10*3/uL 172 215  --    [facility] PLATELETS AUTO [place]/uL  --   --  266   NEUTROS PCT AUTO %  --  75.3  --    LYMPHS PCT AUTO %  --  17.0  --    [facility] LYMPHO PCT MAN %  --   --  15.1   MONOS PCT AUTO %  --  6.1  --    [facility] MONO PCT MAN %  --   --  5.2   EOS PCT AUTO %  --  1.0  --    [facility] EOSINO PCT MAN %  --   --  1.0     Results from last 7 days   Lab Units 04/15/25  1728 04/14/25  1231   APTT seconds 32 29   INR   --  1.1     Results from last 7 days   Lab Units 04/14/25  1231 04/14/25  1015   [facility] SODIUM mmol/L  --  142   SODIUM mmol/L 141  --    [facility] POTASSIUM mmol/L  --  4.1   POTASSIUM mmol/L 4.3  --    [facility] CHLORIDE mmol/L  --  108   CHLORIDE mmol/L 109*  --    [facility] CO2 mmol/L  --  27   CO2 mmol/L 27  --    BUN mg/dL 19  --    [facility] BUN mg/dL  --  19   CREATININE mg/dL 0.74  --    [facility] CREATININE mg/dL  --  0.77    [facility] CALCIUM mg/dL  --  8.8   CALCIUM mg/dL 8.9  --    [facility] PROTEIN TOTAL g/dL  --  6.0*   PROTEIN TOTAL g/dL 6.0*  --    BILIRUBIN TOTAL mg/dL 1.5*  --    [facility] BILIRUBIN TOTAL mg/dL  --  1.2   [facility] ALK PHOS U/L  --  151   ALK PHOS U/L 164*  --    [facility] ALT U/L  --  18   ALT U/L 19  --    [facility] AST U/L  --  30   AST U/L 34  --    [facility] GLUCOSE mg/dL  --  79   GLUCOSE mg/dL 83  --      Results from last 7 days   Lab Units 04/14/25  1231 04/14/25  1015   BILIRUBIN TOTAL mg/dL 1.5*  --    [facility] BILIRUBIN TOTAL mg/dL  --  1.2           I have reviewed all medications, laboratory results, and imaging pertinent for today's encounter.

## 2025-04-16 NOTE — CARE PLAN
Problem: Pain - Adult  Goal: Verbalizes/displays adequate comfort level or baseline comfort level  Outcome: Progressing     Problem: Safety - Adult  Goal: Free from fall injury  Outcome: Progressing     Problem: Chronic Conditions and Co-morbidities  Goal: Patient's chronic conditions and co-morbidity symptoms are monitored and maintained or improved  Outcome: Progressing     Problem: Nutrition  Goal: Nutrient intake appropriate for maintaining nutritional needs  Outcome: Progressing     Problem: Fall/Injury  Goal: Not fall by end of shift  Outcome: Progressing     Problem: Respiratory  Goal: Clear secretions with interventions this shift  Outcome: Progressing   The patient's goals for the shift include      The clinical goals for the shift include

## 2025-04-17 ENCOUNTER — HOME HEALTH ADMISSION (OUTPATIENT)
Dept: HOME HEALTH SERVICES | Facility: HOME HEALTH | Age: 65
End: 2025-04-17
Payer: COMMERCIAL

## 2025-04-17 ENCOUNTER — DOCUMENTATION (OUTPATIENT)
Dept: HOME HEALTH SERVICES | Facility: HOME HEALTH | Age: 65
End: 2025-04-17
Payer: COMMERCIAL

## 2025-04-17 VITALS
OXYGEN SATURATION: 98 % | SYSTOLIC BLOOD PRESSURE: 112 MMHG | HEIGHT: 69 IN | DIASTOLIC BLOOD PRESSURE: 73 MMHG | BODY MASS INDEX: 17.77 KG/M2 | TEMPERATURE: 97.1 F | HEART RATE: 97 BPM | WEIGHT: 120 LBS | RESPIRATION RATE: 16 BRPM

## 2025-04-17 LAB
ERYTHROCYTE [DISTWIDTH] IN BLOOD BY AUTOMATED COUNT: 17.4 % (ref 11.5–14.5)
HCT VFR BLD AUTO: 30.9 % (ref 36–46)
HGB BLD-MCNC: 9.6 G/DL (ref 12–16)
MCH RBC QN AUTO: 30.3 PG (ref 26–34)
MCHC RBC AUTO-ENTMCNC: 31.1 G/DL (ref 32–36)
MCV RBC AUTO: 98 FL (ref 80–100)
NRBC BLD-RTO: 0 /100 WBCS (ref 0–0)
PLATELET # BLD AUTO: 178 X10*3/UL (ref 150–450)
RBC # BLD AUTO: 3.17 X10*6/UL (ref 4–5.2)
UFH PPP CHRO-ACNC: 0.4 IU/ML (ref ?–1.1)
WBC # BLD AUTO: 6.3 X10*3/UL (ref 4.4–11.3)

## 2025-04-17 PROCEDURE — 85027 COMPLETE CBC AUTOMATED: CPT

## 2025-04-17 PROCEDURE — 36415 COLL VENOUS BLD VENIPUNCTURE: CPT

## 2025-04-17 PROCEDURE — 85520 HEPARIN ASSAY: CPT

## 2025-04-17 PROCEDURE — 97165 OT EVAL LOW COMPLEX 30 MIN: CPT | Mod: GO

## 2025-04-17 PROCEDURE — 2500000001 HC RX 250 WO HCPCS SELF ADMINISTERED DRUGS (ALT 637 FOR MEDICARE OP)

## 2025-04-17 PROCEDURE — 2500000002 HC RX 250 W HCPCS SELF ADMINISTERED DRUGS (ALT 637 FOR MEDICARE OP, ALT 636 FOR OP/ED)

## 2025-04-17 RX ORDER — APIXABAN 5 MG (74)
KIT ORAL
Qty: 74 TABLET | Refills: 0 | Status: SHIPPED | OUTPATIENT
Start: 2025-04-17

## 2025-04-17 RX ORDER — AMOXICILLIN 250 MG
1 CAPSULE ORAL DAILY
Qty: 5 TABLET | Refills: 0 | Status: SHIPPED | OUTPATIENT
Start: 2025-04-17 | End: 2025-04-22

## 2025-04-17 RX ORDER — OXYCODONE HYDROCHLORIDE 5 MG/1
5 TABLET ORAL EVERY 6 HOURS PRN
Qty: 15 TABLET | Refills: 0 | Status: SHIPPED | OUTPATIENT
Start: 2025-04-17

## 2025-04-17 RX ADMIN — NITROFURANTOIN MONOHYDRATE/MACROCRYSTALS 100 MG: 25; 75 CAPSULE ORAL at 09:13

## 2025-04-17 RX ADMIN — ATORVASTATIN CALCIUM 40 MG: 40 TABLET, FILM COATED ORAL at 09:13

## 2025-04-17 RX ADMIN — BACLOFEN 10 MG: 10 TABLET ORAL at 13:44

## 2025-04-17 RX ADMIN — BACLOFEN 10 MG: 10 TABLET ORAL at 06:16

## 2025-04-17 RX ADMIN — ASPIRIN 81 MG: 81 TABLET, COATED ORAL at 09:13

## 2025-04-17 ASSESSMENT — ACTIVITIES OF DAILY LIVING (ADL): BATHING_ASSISTANCE: MODERATE

## 2025-04-17 ASSESSMENT — COGNITIVE AND FUNCTIONAL STATUS - GENERAL
TOILETING: A LOT
DRESSING REGULAR LOWER BODY CLOTHING: A LOT
PERSONAL GROOMING: A LITTLE
HELP NEEDED FOR BATHING: A LOT
DAILY ACTIVITIY SCORE: 17

## 2025-04-17 ASSESSMENT — PAIN - FUNCTIONAL ASSESSMENT
PAIN_FUNCTIONAL_ASSESSMENT: 0-10
PAIN_FUNCTIONAL_ASSESSMENT: 0-10

## 2025-04-17 ASSESSMENT — PAIN SCALES - GENERAL
PAINLEVEL_OUTOF10: 0 - NO PAIN
PAINLEVEL_OUTOF10: 0 - NO PAIN

## 2025-04-17 NOTE — PROGRESS NOTES
Occupational Therapy    Evaluation    Patient Name: Kristi Pat  MRN: [id number]  Department: [facility]  Room: [facility]  Today's Date: 4/17/2025  Time Calculation  Start Time: 0840  Stop Time: 0855  Time Calculation (min): 15 min    Assessment  IP OT Assessment  OT Assessment: difficulty I/ADLS, safety, fxnl mob  Prognosis: Good  Barriers to Discharge Home: No anticipated barriers ( A and lift at baseline)  Evaluation/Treatment Tolerance: Patient limited by fatigue  Medical Staff Made Aware: Yes  End of Session Communication: Bedside nurse  End of Session Patient Position: Bed, 3 rail up, Alarm off, not on at start of session  Plan:  Treatment Interventions: ADL retraining, Functional transfer training, UE strengthening/ROM, Endurance training, Patient/family training, Equipment evaluation/education, Compensatory technique education  OT Frequency: 2 times per week  OT Discharge Recommendations: Low intensity level of continued care  Equipment Recommended upon Discharge:  (hip kit as needed)  OT Recommended Transfer Status: Assist of 1  OT - OK to Discharge: Yes    Subjective   Current Problem:  1. Closed bicondylar fracture of distal femur, left, initial encounter  Referral to Home Health    Wheeled Folding Walker      2. Closed fracture of distal end of left femur, unspecified fracture morphology, initial encounter  Case Request Operating Room: INSERTION, RETROGRADE INTRAMEDULLARY MERLINE, FEMUR    Case Request Operating Room: INSERTION, RETROGRADE INTRAMEDULLARY MERLINE, FEMUR    calcium carbonate-vitamin D3 (Oscal-500) 500 mg-10 mcg (400 unit) tablet      3. Acute deep vein thrombosis (DVT) of femoral vein of left lower extremity          General:  General  Reason for Referral: 1. subacute distal femur fracture   2. nonocclusive left femoral vein DVT  Past Medical History Relevant to Rehab: 1. Multiple sclerosis  2. TIA  3. Recurrent UTI  Family/Caregiver Present: No  Prior to Session Communication: Bedside  nurse  Patient Position Received: Bed, 3 rail up, Alarm off, not on at start of session  General Comment: Pt reports  A transfers at home with lift to wheelchair  Precautions:  LE Weight Bearing Status:  (WBAT lle)  Medical Precautions: Fall precautions           Pain:  Pain Assessment  Pain Assessment: 0-10  0-10 (Numeric) Pain Score: 0 - No pain    Objective   Cognition:  Overall Cognitive Status: Within Functional Limits  Arousal/Alertness: Appropriate responses to stimuli  Orientation Level: Oriented X4  Following Commands: Follows all commands and directions without difficulty  Insight: Within function limits           Home Living:  Type of Home: Independent living  Lives With: Spouse  Home Adaptive Equipment: Wheelchair-power, Scooter (stand lift)  Home Layout: One level  Home Access: Ramped entrance  Bathroom Shower/Tub: Walk-in shower  Bathroom Toilet: Handicapped height  Bathroom Equipment: Grab bars in shower (can use w/c in shower)   Prior Function:  Level of [place]: Needs assistance with ADLs, Needs assistance with homemaking, Needs assistance with functional transfers  Receives Help From:  (spouse)  Ambulatory Assistance: Needs assistance  Transfers:  (stand lift and pivot to wheelchair)  Vocational: Unemployed  Leisure: tv  Hand Dominance: Right  IADL History:  IADL Comments: A cooking with  pt preps he lifts into oven, A driving, A other IADLS  ADL:  Eating Assistance:  (ate I end session)  Grooming Assistance: Stand by (anticipated seated)  Bathing Assistance: Moderate (anticipated)  UE Dressing Assistance: Independent  LE Dressing Assistance: Maximal (ginger B socks sup)  Toileting Assistance with Device: Maximal (anticipated)  Functional Deficit: Setup, Steadying, Verbal cueing, Supervision/safety, Increased time to complete  Activity Tolerance:  Endurance:  (fair)  Bed Mobility/Transfers: Bed Mobility  Bed Mobility:  (sup/sit mod A, boost trendelenberg mod  A)    Transfers  Transfer: No (pt declined)      Functional Mobility:  Functional Mobility  Functional Mobility Performed: No  Sitting Balance:  Dynamic Sitting Balance  Dynamic Sitting-Level of Assistance: Contact guard       IADL's:   IADL Comments: A cooking with  pt preps he lifts into oven, A driving, A other IADLS  Vision: Vision - Basic Assessment  Current Vision: Wears glasses all the time  Sensation:  Light Touch:  (IMP light touch BLE)  Strength:  Strength Comments: BUE 4+/5  Perception:     Coordination:  Movements are Fluid and Coordinated: Yes   Hand Function:  Hand Function  Gross Grasp: Functional  Extremities: RUE   RUE : Within Functional Limits and LUE   LUE: Within Functional Limits      Outcome Measures: [facility] Daily Activity  Putting on and taking off regular lower body clothing: A lot  Bathing (including washing, rinsing, drying): A lot  Putting on and taking off regular upper body clothing: None  Toileting, which includes using toilet, bedpan or urinal: A lot  Taking care of personal grooming such as brushing teeth: A little  Eating Meals: None  Daily Activity - Total Score: 17         and OT Adult Other Outcome Measures  4AT: -  Education Documentation  Body Mechanics, taught by Irena Santoyo OT at 4/17/2025 10:22 AM.  Learner: Patient  Readiness: Acceptance  Method: Explanation, Demonstration  Response: Verbalizes Understanding, Needs Reinforcement  Comment: melecio mcgarry ADLs    Precautions, taught by Irena Santoyo OT at 4/17/2025 10:22 AM.  Learner: Patient  Readiness: Acceptance  Method: Explanation, Demonstration  Response: Verbalizes Understanding, Needs Reinforcement  Comment: melecio mcgarry ADLs    ADL Training, taught by Irena Santoyo OT at 4/17/2025 10:22 AM.  Learner: Patient  Readiness: Acceptance  Method: Explanation, Demonstration  Response: Verbalizes Understanding, Needs Reinforcement  Comment: melecio mob ADLs    Education Comments  No comments found.      Goals:    Encounter Problems       Encounter Problems (Active)       ADLs       Patient will perform UB and LB bathing  with modified independent level of assistance and ae. (Progressing)       Start:  04/17/25    Expected End:  05/08/25            Patient with complete upper body dressing with independent level of assistance (Progressing)       Start:  04/17/25    Expected End:  05/08/25            Patient with complete lower body dressing with stand by assist level of assistance donning and doffing all LE clothes  with PRN adaptive equipment  (Progressing)       Start:  04/17/25    Expected End:  05/08/25            Patient will complete daily grooming tasks  with independent level of assistance  (Progressing)       Start:  04/17/25    Expected End:  05/08/25            Patient will complete toileting including hygiene clothing management/hygiene with moderate assist level of assistance and lrd. (Progressing)       Start:  04/17/25    Expected End:  05/08/25               EXERCISE/STRENGTHENING       Patient with increase BUE to wfl strength. (Progressing)       Start:  04/17/25    Expected End:  05/08/25               TRANSFERS       Patient will perform bed mobility contact guard assist level of assistance and bed rails in order to improve safety and independence with mobility (Progressing)       Start:  04/17/25    Expected End:  05/08/25            Patient will complete functional transferleast restrictive device with minimal assist  level of assistance. (Progressing)       Start:  04/17/25    Expected End:  05/08/25

## 2025-04-17 NOTE — HH CARE COORDINATION
Home Care received a Referral for Nursing, Physical Therapy, and Occupational Therapy. We have processed the referral for a Start of Care on 4/18-4/19.     If you have any questions or concerns, please feel free to contact us at [phone]. Follow the prompts, enter your five digit zip code, and you will be directed to your care team on [facility] 2.

## 2025-04-17 NOTE — DISCHARGE SUMMARY
Discharge Diagnosis  Closed fracture of left distal femur    Issues Requiring Follow-Up  Follow up with Orthopedics regarding left femur fx  Follow up with primary care regarding Eliquis for DVT    Test Results Pending At Discharge  Pending Labs       No current pending labs.            Hospital Course  64F presented as a trauma consult, transfer from outside hospital, after fall 2 weeks prior to presentation. Imaging showed L femur fracture and L femoral vein DVT. Patient placed on Heparin gtt for DVT. Patient to OR with orthopedics on 4/15 for L femur IMN. Orthopedics recommended WBAT LLE and ASA BID x 4 weeks post-op for chemoprophylaxis. Patient to take Calcium/Vitamin D x 30 days on discharge. Surgical dressing may be removed 4/22. Patient to follow up with orthopedics as an outpatient. Post-operative CBC with Hgb 6.9. Patient received 1u PRBCs with appropriate incrementation to 9.2. PT/OT recommended low intensity at discharge. Wound care consulted while inpatient for chronic sacral wound.   At the time of discharge, patient's pain was controlled with oral analgesia, patient was urinating, having BMs, sleeping, and eating well. Based on PT/OT's recommendation, patient was discharged home with home care/wound care with scripts and follow up appointments. Discharge plan was discussed with the patient and all of the patient's questions were answered.  Patient sent with 30 day supply of eliquis for DVT    Pertinent Physical Exam At Time of Discharge  Constitutional:       Appearance: Normal appearance.   HENT:      Head: Normocephalic and atraumatic.      Right Ear: External ear normal.      Left Ear: External ear normal.      Nose: Nose normal.      Mouth/Throat:      Mouth: Mucous membranes are dry.      Pharynx: Oropharynx is clear.   Eyes:      Extraocular Movements: Extraocular movements intact.      Conjunctiva/sclera: Conjunctivae normal.   Cardiovascular:      Rate and Rhythm: Normal rate.      Pulses:  Normal pulses.      Heart sounds: Normal heart sounds.   Pulmonary:      Effort: Pulmonary effort is normal.      Breath sounds: Normal breath sounds.   Abdominal:      General: There is no distension.      Palpations: Abdomen is soft.      Tenderness: There is no abdominal tenderness. There is no guarding.   Musculoskeletal:         General: Swelling and tenderness present.      Cervical back: Normal range of motion. No tenderness.      Comments: LLE: Post-operative dressings (staples, Mepilex x4, ACE) c/d/I, able to wiggle toes and plantar/dorsi flex at ankle. Pulses 2+ with SILT. Compartments soft and compressible.   Skin:     General: Skin is warm and dry.      Capillary Refill: Capillary refill takes less than 2 seconds.   Neurological:      Mental Status: She is alert and oriented to person, place, and time.      GCS: GCS eye subscore is 4. GCS verbal subscore is 5. GCS motor subscore is 6.      Sensory: Sensation is intact.      Motor: Motor function is intact.   Psychiatric:         Mood and Affect: Mood normal.         Behavior: Behavior normal.     Home Medications     Medication List      START taking these medications     calcium carbonate-vitamin D3 500 mg-10 mcg (400 unit) tablet; Commonly   known as: Oscal-500; Take 1 tablet by mouth 2 times a day.   Eliquis DVT-PE Treat 30D Start 5 mg (74 tabs) tablet; Generic drug:   apixaban; Take 2 tablets (10 mg) by mouth 2 times a day for 7 days, then   take 1 tablet (5 mg) by mouth 2 times a day.   oxyCODONE 5 mg immediate release tablet; Commonly known as: Roxicodone;   Take 1 tablet (5 mg) by mouth every 6 hours if needed for moderate pain (4   - 6) or severe pain (7 - 10).   sennosides-docusate sodium 8.6-50 mg tablet; Commonly known as:   Yarelis-Colace; Take 1 tablet by mouth once daily for 5 days.     CONTINUE taking these medications     aspirin 81 mg EC tablet   atorvastatin 40 mg tablet; Commonly known as: Lipitor; Take 1 tablet (40   mg) by mouth once  daily.   baclofen 10 mg tablet; Commonly known as: Lioresal; Take 1 tablet (10   mg) by mouth 4 times a day.   levoFLOXacin 500 mg tablet; Commonly known as: Levaquin   methotrexate 2.5 mg tablet; Commonly known as: Trexall; TAKE 6 TABLETS   ONCE EVERY WEEK. FOLLOW DIRECTIONS CAREFULLY AND ASK TO EXPLAIN ANY PART   YOU DO NOT UNDERSTAND. TAKE EXACTLY AS DIRECTED.   * nitrofurantoin (macrocrystal-monohydrate) 100 mg capsule; Commonly   known as: Macrobid; Take 1 capsule (100 mg) by mouth once daily.   * nitrofurantoin (macrocrystal-monohydrate) 100 mg capsule; Commonly   known as: Macrobid; TAKE 1 CAPSULE DAILY  * This list has 2 medication(s) that are the same as other medications   prescribed for you. Read the directions carefully, and ask your doctor or   other care provider to review them with you.       Outpatient Follow-Up  Future Appointments   Date Time Provider Department Center   4/18/2025 To Be Determined Eddie Jose RN [facility]   4/21/2025 To Be Determined Dayna Mayes OT [facility]   4/22/2025 To Be Determined Jacey Richards, PT [facility]   5/14/2025 10:30 AM Sylvia Antoine PA-C [id number] [facility]   6/5/2025 11:00 AM [facility] [id number] YOLANDA 1 [id number] [facility]   6/5/2025 11:30 AM [facility] [id number] DXA 1 [id number] [facility]   8/29/2025 10:30 AM Prince Don MD [id number] [facility]   4/7/2026 10:00 AM Rhonda Qureshi MD [id number] [facility]       Sandip Rehman PA-C

## 2025-04-17 NOTE — CARE PLAN
Problem: Safety - Adult  Goal: Free from fall injury  Outcome: Met     Problem: Discharge Planning  Goal: Discharge to home or other facility with appropriate resources  Outcome: Met     Problem: Chronic Conditions and Co-morbidities  Goal: Patient's chronic conditions and co-morbidity symptoms are monitored and maintained or improved  Outcome: Met     Problem: Nutrition  Goal: Nutrient intake appropriate for maintaining nutritional needs  Outcome: Met     Problem: Fall/Injury  Goal: Not fall by end of shift  Outcome: Met  Goal: Be free from injury by end of the shift  Outcome: Met  Goal: Verbalize understanding of personal risk factors for fall in the hospital  Outcome: Met  Goal: Verbalize understanding of risk factor reduction measures to prevent injury from fall in the home  Outcome: Met  Goal: Use assistive devices by end of the shift  Outcome: Met  Goal: Pace activities to prevent fatigue by end of the shift  Outcome: Met     Problem: Pain  Goal: Takes deep breaths with improved pain control throughout the shift  Outcome: Met  Goal: Turns in bed with improved pain control throughout the shift  Outcome: Met  Goal: Walks with improved pain control throughout the shift  Outcome: Progressing  Goal: Performs ADL's with improved pain control throughout shift  Outcome: Met  Goal: Participates in PT with improved pain control throughout the shift  Outcome: Met  Goal: Free from opioid side effects throughout the shift  Outcome: Met  Goal: Free from acute confusion related to pain meds throughout the shift  Outcome: Met     Problem: Respiratory  Goal: Clear secretions with interventions this shift  Outcome: Met  Goal: Minimize anxiety/maximize coping throughout shift  Outcome: Met  Goal: Minimal/no exertional discomfort or dyspnea this shift  Outcome: Met  Goal: No signs of respiratory distress (eg. Use of accessory muscles. Peds grunting)  Outcome: Met  Goal: Patent airway maintained this shift  Outcome: Met  Goal:  Verbalize decreased shortness of breath this shift  Outcome: Met  Goal: Wean oxygen to maintain O2 saturation per order/standard this shift  Outcome: Met  Goal: Increase self care and/or family involvement in next 24 hours  Outcome: Met     Problem: Skin  Goal: Decreased wound size/increased tissue granulation at next dressing change  Outcome: Progressing  Goal: Participates in plan/prevention/treatment measures  Outcome: Progressing  Goal: Prevent/manage excess moisture  Outcome: Progressing  Goal: Prevent/minimize sheer/friction injuries  Outcome: Progressing  Goal: Promote/optimize nutrition  Outcome: Progressing  Goal: Promote skin healing  Outcome: Progressing   The patient's goals for the shift include      The clinical goals for the shift include remain comfortable and hemodynamically stable after transfusion

## 2025-04-17 NOTE — PROGRESS NOTES
04/17/25 1329   Discharge Planning   Type of Home Care Services Home PT;Home OT;Home nursing visits   Expected Discharge Disposition Home H   Does the patient need discharge transport arranged? No   RoundTrip coordination needed? No     Transitional Care Coordination Progress Note:  Patient notified of PT/OT recommendation for HHC, no preference for a HC agency, agreeable to [facility]. Per pt her hsuband will be the primary learner for wound care.    REJI Rehman notified that HC RN and wound care orders are missing from HC referral.   PA and patients bedside nurse advised that pt not safe for discharge until HC is confirmed.     This nurse called the Trauma team room and spoke with Marisa, notified her of the above and she stated she will contact REJI Rehman and notify.    Addendum 1507: [facility] confirmed a SOC date for either 4/18 or 4/19. Patient and REJI Rehman notified of the above.  Per PA patient and her  feel comfortable managing wound care at home.    Pts bedside nurse to send the patient home with a 3 day supply of wound care supplies.    Stephanie Stein RN, BSN  Transitional Care Coordinator  Office: [phone]  Secure chat via [employer]

## 2025-04-17 NOTE — CARE PLAN
The patient's goals for the shift include      The clinical goals for the shift include pt will remain HDS by end of shift      Problem: Pain  Goal: Walks with improved pain control throughout the shift  Outcome: Progressing     Problem: Skin  Goal: Decreased wound size/increased tissue granulation at next dressing change  Outcome: Progressing  Flowsheets (Taken 4/17/2025 1053)  Decreased wound size/increased tissue granulation at next dressing change: Promote sleep for wound healing  Goal: Participates in plan/prevention/treatment measures  Outcome: Progressing  Flowsheets (Taken 4/17/2025 1053)  Participates in plan/prevention/treatment measures: Discuss with provider PT/OT consult  Goal: Prevent/manage excess moisture  Outcome: Progressing  Goal: Prevent/minimize sheer/friction injuries  Outcome: Progressing

## 2025-04-18 ENCOUNTER — HOME CARE VISIT (OUTPATIENT)
Dept: HOME HEALTH SERVICES | Facility: HOME HEALTH | Age: 65
End: 2025-04-18
Payer: COMMERCIAL

## 2025-04-18 ENCOUNTER — APPOINTMENT (OUTPATIENT)
Dept: WOUND CARE | Facility: CLINIC | Age: 65
End: 2025-04-18
Payer: COMMERCIAL

## 2025-04-18 ENCOUNTER — PATIENT OUTREACH (OUTPATIENT)
Dept: PRIMARY CARE | Facility: CLINIC | Age: 65
End: 2025-04-18
Payer: COMMERCIAL

## 2025-04-18 LAB
BLOOD EXPIRATION DATE: NORMAL
BLOOD EXPIRATION DATE: NORMAL
DISPENSE STATUS: NORMAL
DISPENSE STATUS: NORMAL
PRODUCT BLOOD TYPE: 7300
PRODUCT BLOOD TYPE: 7300
PRODUCT CODE: NORMAL
PRODUCT CODE: NORMAL
UNIT ABO: NORMAL
UNIT ABO: NORMAL
UNIT NUMBER: NORMAL
UNIT NUMBER: NORMAL
UNIT RH: NORMAL
UNIT RH: NORMAL
UNIT VOLUME: 350
UNIT VOLUME: 350
XM INTEP: NORMAL
XM INTEP: NORMAL

## 2025-04-18 PROCEDURE — G0299 HHS/HOSPICE OF RN EA 15 MIN: HCPCS

## 2025-04-18 ASSESSMENT — ENCOUNTER SYMPTOMS
MUSCLE WEAKNESS: 1
LAST BOWEL MOVEMENT: 67313
BOWEL INCONTINENCE: 1
LIMITED RANGE OF MOTION: 1
APPETITE LEVEL: GOOD
PERSON REPORTING PAIN: PATIENT
LOWER EXTREMITY EDEMA: 1
DENIES PAIN: 1

## 2025-04-18 ASSESSMENT — ACTIVITIES OF DAILY LIVING (ADL)
DRESSING_LB_CURRENT_FUNCTION: MAXIMUM ASSIST
FEEDING ASSESSED: 1
DRESSING_UB_CURRENT_FUNCTION: MAXIMUM ASSIST
AMBULATION ASSISTANCE: 1
OASIS_M1830: 03
TRANSPORTATION: DEPENDENT
PHYSICAL TRANSFERS ASSESSED: 1
ENTERING_EXITING_HOME: MAXIMUM ASSIST
TOILETING: MAXIMUM ASSIST
FEEDING: MODERATE ASSIST
AMBULATION ASSISTANCE: MAXIMUM ASSIST
GROOMING_CURRENT_FUNCTION: MAXIMUM ASSIST
TRANSPORTATION ASSESSED: 1
BATHING_CURRENT_FUNCTION: MAXIMUM ASSIST
TOILETING: 1
CURRENT_FUNCTION: MAXIMUM ASSIST
BATHING ASSESSED: 1
GROOMING ASSESSED: 1
PHYSICAL_TRANSFERS_DEVICES: HOYER LIFT

## 2025-04-18 NOTE — PROGRESS NOTES
Discharge Facility: [facility]   Discharge Diagnosis: Closed fracture of left distal femur, L femoral vein DVT   Admission Date: 4/15/25  Discharge Date: 4/17/25    PCP Appointment Date: no appointment, message to office  Specialist Appointment Date: 5/14/25  Hospital Encounter and Summary Linked: Yes  ED to Hosp-Admission (Discharged) with Manpreet Saenz DO; Angel Webber MD (04/15/2025)   Two attempts were made to reach patient within two business days after discharge. Voicemail left with contact information for patient to call back with any non-emergent questions or concerns.

## 2025-04-21 ENCOUNTER — HOME CARE VISIT (OUTPATIENT)
Dept: HOME HEALTH SERVICES | Facility: HOME HEALTH | Age: 65
End: 2025-04-21
Payer: COMMERCIAL

## 2025-04-21 PROCEDURE — G0151 HHCP-SERV OF PT,EA 15 MIN: HCPCS

## 2025-04-22 ENCOUNTER — HOME CARE VISIT (OUTPATIENT)
Dept: HOME HEALTH SERVICES | Facility: HOME HEALTH | Age: 65
End: 2025-04-22
Payer: COMMERCIAL

## 2025-04-22 VITALS
TEMPERATURE: 97 F | SYSTOLIC BLOOD PRESSURE: 122 MMHG | DIASTOLIC BLOOD PRESSURE: 64 MMHG | OXYGEN SATURATION: 98 % | HEART RATE: 75 BPM

## 2025-04-22 VITALS
DIASTOLIC BLOOD PRESSURE: 68 MMHG | RESPIRATION RATE: 16 BRPM | OXYGEN SATURATION: 99 % | HEART RATE: 78 BPM | SYSTOLIC BLOOD PRESSURE: 126 MMHG

## 2025-04-22 PROCEDURE — G0300 HHS/HOSPICE OF LPN EA 15 MIN: HCPCS

## 2025-04-22 PROCEDURE — G0152 HHCP-SERV OF OT,EA 15 MIN: HCPCS

## 2025-04-22 SDOH — ECONOMIC STABILITY: HOUSING INSECURITY: HOME SAFETY: WBAT LLE

## 2025-04-22 SDOH — ECONOMIC STABILITY: HOUSING INSECURITY: HOME SAFETY: WBAT

## 2025-04-22 ASSESSMENT — ENCOUNTER SYMPTOMS
PAIN LOCATION - EXACERBATING FACTORS: NONE NOTED
PAIN LOCATION - PAIN QUALITY: SPASM
DENIES PAIN: 1
PAIN LOCATION - RELIEVING FACTORS: BACLOFEN
PAIN LOCATION: LEFT LEG
MUSCLE WEAKNESS: 1
PERSON REPORTING PAIN: PATIENT
LIMITED RANGE OF MOTION: 1
DENIES PAIN: 1
PERSON REPORTING PAIN: PATIENT
PAIN LOCATION - PAIN FREQUENCY: INTERMITTENT

## 2025-04-23 ENCOUNTER — APPOINTMENT (OUTPATIENT)
Dept: HOME HEALTH SERVICES | Facility: HOME HEALTH | Age: 65
End: 2025-04-23
Payer: COMMERCIAL

## 2025-04-23 ENCOUNTER — APPOINTMENT (OUTPATIENT)
Dept: PRIMARY CARE | Facility: CLINIC | Age: 65
End: 2025-04-23
Payer: COMMERCIAL

## 2025-04-24 ENCOUNTER — OFFICE VISIT (OUTPATIENT)
Dept: PRIMARY CARE | Facility: CLINIC | Age: 65
End: 2025-04-24
Payer: COMMERCIAL

## 2025-04-24 VITALS
DIASTOLIC BLOOD PRESSURE: 56 MMHG | HEART RATE: 91 BPM | OXYGEN SATURATION: 96 % | SYSTOLIC BLOOD PRESSURE: 99 MMHG | TEMPERATURE: 98.1 F

## 2025-04-24 DIAGNOSIS — I82.412 ACUTE DEEP VEIN THROMBOSIS (DVT) OF FEMORAL VEIN OF LEFT LOWER EXTREMITY: ICD-10-CM

## 2025-04-24 DIAGNOSIS — Z09 HOSPITAL DISCHARGE FOLLOW-UP: Primary | ICD-10-CM

## 2025-04-24 DIAGNOSIS — S72.492A OTHER CLOSED FRACTURE OF DISTAL END OF LEFT FEMUR, INITIAL ENCOUNTER: ICD-10-CM

## 2025-04-24 PROCEDURE — 1036F TOBACCO NON-USER: CPT | Performed by: INTERNAL MEDICINE

## 2025-04-24 PROCEDURE — 99495 TRANSJ CARE MGMT MOD F2F 14D: CPT | Performed by: INTERNAL MEDICINE

## 2025-04-24 NOTE — PROGRESS NOTES
PCP: Rhonda Qureshi MD   I have reviewed existing histories, notes, past medical history, surgical history, social history, family history, med list, allergy list, and immunization, and updated.    HPI:     [facility]. Detailed info as below. Developed DVT in the meantime. Was put on Eliquis. Provoked non recurrent DVT, anticoagulation for 3-6 months.     +++++++++++++++++++++++++++++++++++++++  Discharge Diagnosis  Closed fracture of left distal femur     Issues Requiring Follow-Up  Follow up with Orthopedics regarding left femur Fx  Follow up with primary care regarding Eliquis for DVT     Hospital Course  64F presented as a trauma consult, transfer from outside hospital, after fall 2 weeks prior to presentation. Imaging showed CL femur fracture and L femoral vein DVT. Patient placed on Heparin gtt for DVT. Patient to OR with orthopedics on 4/15 for L femur IMN. Orthopedics recommended WBAT LLE and ASA BID x 4 weeks post-op for chemoprophylaxis. Patient to take Calcium/Vitamin D x 30 days on discharge. Surgical dressing may be removed 4/22. Patient to follow up with orthopedics as an outpatient. Post-operative CBC with Hgb 6.9. Patient received 1u PRBCs with appropriate incrementation to 9.2. PT/OT recommended low intensity at discharge. Wound care consulted while inpatient for chronic sacral wound.   At the time of discharge, patient's pain was controlled with oral analgesia, patient was urinating, having BMs, sleeping, and eating well. Based on PT/OT's recommendation, patient was discharged home with home care/wound care with scripts and follow up appointments. Discharge plan was discussed with the patient and all of the patient's questions were answered.  Patient sent with 30 day supply of eliquis for DVT  Lab Results   Component Value Date    WBC 6.3 04/17/2025    HGB 9.6 (L) 04/17/2025    HCT 30.9 (L) 04/17/2025     04/17/2025    CHOL 100 04/14/2025    TRIG 72 04/14/2025    HDL 36 (L) 04/14/2025     LDLDIRECT 141 (H) 04/23/2018    ALT 19 04/14/2025    AST 34 04/14/2025     04/16/2025    K 4.1 04/16/2025     (H) 04/16/2025    CREATININE 0.68 04/16/2025    BUN 17 04/16/2025    CO2 24 04/16/2025    TSH 2.20 04/14/2025    INR 1.1 04/14/2025    HGBA1C 4.6 04/14/2025     Physical exam:  BP 99/56   Pulse 91   Temp 36.7 °C (98.1 °F)   SpO2 96%    NAD,  Heart exam showed normal heart sounds, no murmur, clicks, gallops or rubs. Regular rate and rhythm. Chest is clear; no wheezes or rales.  left thigh has edema, as expected. Multiple bands that cover the wounds. We checked some of the wounds, no signs of infection  No tenderness   No lower leg edema.    Assessments/orders:   Assessment & Plan  Hospital discharge follow-up         Other closed fracture of distal end of left femur, initial encounter         Acute deep vein thrombosis (DVT) of femoral vein of left lower extremity  They will let me know when needing refills of eliquis 5mg bid, for at least two more months after the first initial month.

## 2025-04-26 ASSESSMENT — ENCOUNTER SYMPTOMS: DENIES PAIN: 1

## 2025-04-26 ASSESSMENT — ACTIVITIES OF DAILY LIVING (ADL): AMBULATION ASSISTANCE: 1

## 2025-04-29 ENCOUNTER — HOME CARE VISIT (OUTPATIENT)
Dept: HOME HEALTH SERVICES | Facility: HOME HEALTH | Age: 65
End: 2025-04-29
Payer: COMMERCIAL

## 2025-04-29 ENCOUNTER — APPOINTMENT (OUTPATIENT)
Dept: ORTHOPEDIC SURGERY | Age: 65
End: 2025-04-29
Payer: COMMERCIAL

## 2025-04-29 VITALS — BODY MASS INDEX: 17.77 KG/M2 | WEIGHT: 120 LBS | HEIGHT: 69 IN

## 2025-04-29 DIAGNOSIS — S72.492A OTHER CLOSED FRACTURE OF DISTAL END OF LEFT FEMUR, INITIAL ENCOUNTER: ICD-10-CM

## 2025-04-29 DIAGNOSIS — M25.452 SWELLING OF JOINT OF PELVIC REGION OR THIGH, LEFT: ICD-10-CM

## 2025-04-29 PROCEDURE — 99024 POSTOP FOLLOW-UP VISIT: CPT | Performed by: PHYSICIAN ASSISTANT

## 2025-04-29 PROCEDURE — 1036F TOBACCO NON-USER: CPT | Performed by: PHYSICIAN ASSISTANT

## 2025-04-29 PROCEDURE — G0157 HHC PT ASSISTANT EA 15: HCPCS | Mod: CQ

## 2025-04-29 PROCEDURE — 3008F BODY MASS INDEX DOCD: CPT | Performed by: PHYSICIAN ASSISTANT

## 2025-04-29 ASSESSMENT — PAIN - FUNCTIONAL ASSESSMENT: PAIN_FUNCTIONAL_ASSESSMENT: NO/DENIES PAIN

## 2025-04-29 NOTE — PROGRESS NOTES
Patient is a 64 y.o. female with history of MS and nonambulatory for years is 2 weeks s/p IMN L femur for left distal femur fracture.  Date of surgery was 4/15/2025.  Patient continues WBAT LLE and able to transfer from her W/C with use of chair lift which is her baseline transfer method.  She denies issues with incision. Patient continues on Eliquis for treatment of L femoral vein DVT . Patient denies fever or chills, N/T or calf pain. Her  is with her today.  She declined imaging here today at [facility], as [facility] does not take her insurance plan.     General: Alert and oriented x 3, NAD, respirations easy and unlabored with no audible wheezes, skin warm and dry, speech and dress appropriate for noted age, affect euthymic.     Musculoskeletal: LLE  incisions c/d/i  mild swelling to lower leg  compartments soft  no calf tenderness  sensation intact to light touch  motor intact including TA/GS/EHL  palpable DP/PT pulses 2+   Good knee motion, 3-95 degrees    X-ray: No new images were obtained today, intra-op fluoro images of left femur reviewed personally by me today and reveal maintenance of alignment of left distal femur fracture with hardware in position.     IMP:  Problem List Items Addressed This Visit       Closed fracture of left distal femur    Relevant Orders    XR femur left 2+ views     Other Visit Diagnoses         Swelling of joint of pelvic region or thigh, left                PLAN:  Her staples were removed today and steri strips applied.  Patient has scheduled appointment with Sylvia Antoine in 2 weeks at [facility] where she plans to have her x-rays taken for that POV.  The x-rays needed at that next visit include left femur and left knee. She can continue with her current WB status and chair lift method for transfers. Okay for left knee ROM as tolerated.  All questions were answered today.

## 2025-05-01 ENCOUNTER — HOME CARE VISIT (OUTPATIENT)
Dept: HOME HEALTH SERVICES | Facility: HOME HEALTH | Age: 65
End: 2025-05-01
Payer: COMMERCIAL

## 2025-05-01 ENCOUNTER — PATIENT OUTREACH (OUTPATIENT)
Dept: PRIMARY CARE | Facility: CLINIC | Age: 65
End: 2025-05-01
Payer: COMMERCIAL

## 2025-05-01 DIAGNOSIS — G45.9 TIA (TRANSIENT ISCHEMIC ATTACK): Primary | ICD-10-CM

## 2025-05-01 PROCEDURE — G0157 HHC PT ASSISTANT EA 15: HCPCS | Mod: CQ

## 2025-05-01 NOTE — PROGRESS NOTES
Completed telephonic follow-up with patient after recent visit with Dr. Qureshi    Spoke to patient during outreach call.    Patient reports feeling: Improved    Patient has questions or concerns about medications: No    Have all prescribed medications been filled? Yes    Patient has necessary resources to manage their care? Yes    Patient has questions or concerns? No    Next care management follow-up approximately within one month.    Patient states she continues with home therapy and is progressing well.        Neurosurgery

## 2025-05-05 ENCOUNTER — HOME CARE VISIT (OUTPATIENT)
Dept: HOME HEALTH SERVICES | Facility: HOME HEALTH | Age: 65
End: 2025-05-05
Payer: COMMERCIAL

## 2025-05-05 PROCEDURE — G0157 HHC PT ASSISTANT EA 15: HCPCS | Mod: CQ

## 2025-05-06 ENCOUNTER — HOME CARE VISIT (OUTPATIENT)
Dept: HOME HEALTH SERVICES | Facility: HOME HEALTH | Age: 65
End: 2025-05-06
Payer: COMMERCIAL

## 2025-05-06 VITALS
DIASTOLIC BLOOD PRESSURE: 66 MMHG | SYSTOLIC BLOOD PRESSURE: 112 MMHG | TEMPERATURE: 97 F | OXYGEN SATURATION: 99 % | WEIGHT: 120 LBS | BODY MASS INDEX: 17.72 KG/M2 | HEART RATE: 81 BPM

## 2025-05-06 PROCEDURE — G0299 HHS/HOSPICE OF RN EA 15 MIN: HCPCS

## 2025-05-06 ASSESSMENT — ENCOUNTER SYMPTOMS
BOWEL INCONTINENCE: 1
PAIN LOCATION - PAIN QUALITY: SHARP
SUBJECTIVE PAIN PROGRESSION: WAXING AND WANING
DENIES PAIN: 1
PAIN SEVERITY GOAL: 0/10
LOWER EXTREMITY EDEMA: 1
PAIN LOCATION - PAIN SEVERITY: 0/10
LIMITED RANGE OF MOTION: 1
APPETITE LEVEL: GOOD
STOOL FREQUENCY: DAILY
MUSCLE WEAKNESS: 1
PAIN LOCATION - PAIN DURATION: SECONDS
LOWEST PAIN SEVERITY IN PAST 24 HOURS: 0/10
CHANGE IN APPETITE: UNCHANGED
HIGHEST PAIN SEVERITY IN PAST 24 HOURS: 1/10
PAIN LOCATION: LEFT LEG
PERSON REPORTING PAIN: PATIENT
PAIN LOCATION - PAIN FREQUENCY: INTERMITTENT

## 2025-05-06 ASSESSMENT — ACTIVITIES OF DAILY LIVING (ADL)
CURRENT_FUNCTION: MAXIMUM ASSIST
GROOMING_CURRENT_FUNCTION: MODERATE ASSIST
GROOMING ASSESSED: 1
PHYSICAL_TRANSFERS_DEVICES: WHEELCHAIR
PHYSICAL TRANSFERS ASSESSED: 1

## 2025-05-07 ENCOUNTER — HOME CARE VISIT (OUTPATIENT)
Dept: HOME HEALTH SERVICES | Facility: HOME HEALTH | Age: 65
End: 2025-05-07
Payer: COMMERCIAL

## 2025-05-07 ENCOUNTER — TELEPHONE (OUTPATIENT)
Dept: PRIMARY CARE | Facility: CLINIC | Age: 65
End: 2025-05-07
Payer: COMMERCIAL

## 2025-05-07 DIAGNOSIS — I82.412 ACUTE DEEP VEIN THROMBOSIS (DVT) OF FEMORAL VEIN OF LEFT LOWER EXTREMITY: Primary | ICD-10-CM

## 2025-05-07 PROCEDURE — G0157 HHC PT ASSISTANT EA 15: HCPCS | Mod: CQ

## 2025-05-09 ENCOUNTER — OFFICE VISIT (OUTPATIENT)
Dept: WOUND CARE | Facility: CLINIC | Age: 65
End: 2025-05-09
Payer: COMMERCIAL

## 2025-05-09 PROCEDURE — 11042 DBRDMT SUBQ TIS 1ST 20SQCM/<: CPT

## 2025-05-09 PROCEDURE — 99213 OFFICE O/P EST LOW 20 MIN: CPT | Mod: 25

## 2025-05-10 LAB — TSH SERPL-ACNC: 2.94 MIU/L (ref 0.4–4.5)

## 2025-05-12 ENCOUNTER — HOME CARE VISIT (OUTPATIENT)
Dept: HOME HEALTH SERVICES | Facility: HOME HEALTH | Age: 65
End: 2025-05-12
Payer: COMMERCIAL

## 2025-05-12 PROCEDURE — G0157 HHC PT ASSISTANT EA 15: HCPCS | Mod: CQ

## 2025-05-14 ENCOUNTER — OFFICE VISIT (OUTPATIENT)
Dept: ORTHOPEDIC SURGERY | Facility: CLINIC | Age: 65
End: 2025-05-14
Payer: COMMERCIAL

## 2025-05-14 ENCOUNTER — HOSPITAL ENCOUNTER (OUTPATIENT)
Dept: RADIOLOGY | Facility: CLINIC | Age: 65
Discharge: HOME | End: 2025-05-14
Payer: COMMERCIAL

## 2025-05-14 DIAGNOSIS — S72.492A OTHER CLOSED FRACTURE OF DISTAL END OF LEFT FEMUR, INITIAL ENCOUNTER: ICD-10-CM

## 2025-05-14 DIAGNOSIS — S72.492A OTHER CLOSED FRACTURE OF DISTAL END OF LEFT FEMUR, INITIAL ENCOUNTER: Primary | ICD-10-CM

## 2025-05-14 DIAGNOSIS — G35 MULTIPLE SCLEROSIS (MULTI): ICD-10-CM

## 2025-05-14 PROCEDURE — A9575 INJ GADOTERATE MEGLUMI 0.1ML: HCPCS | Performed by: PSYCHIATRY & NEUROLOGY

## 2025-05-14 PROCEDURE — 73560 X-RAY EXAM OF KNEE 1 OR 2: CPT | Mod: LEFT SIDE | Performed by: RADIOLOGY

## 2025-05-14 PROCEDURE — 1036F TOBACCO NON-USER: CPT | Performed by: PHYSICIAN ASSISTANT

## 2025-05-14 PROCEDURE — 2550000001 HC RX 255 CONTRASTS: Performed by: PSYCHIATRY & NEUROLOGY

## 2025-05-14 PROCEDURE — 99211 OFF/OP EST MAY X REQ PHY/QHP: CPT | Performed by: PHYSICIAN ASSISTANT

## 2025-05-14 PROCEDURE — 73552 X-RAY EXAM OF FEMUR 2/>: CPT | Mod: LT

## 2025-05-14 PROCEDURE — 73560 X-RAY EXAM OF KNEE 1 OR 2: CPT | Mod: LT

## 2025-05-14 PROCEDURE — 73552 X-RAY EXAM OF FEMUR 2/>: CPT | Mod: LEFT SIDE | Performed by: RADIOLOGY

## 2025-05-14 PROCEDURE — 70553 MRI BRAIN STEM W/O & W/DYE: CPT | Performed by: RADIOLOGY

## 2025-05-14 PROCEDURE — 70553 MRI BRAIN STEM W/O & W/DYE: CPT

## 2025-05-14 RX ORDER — GADOTERATE MEGLUMINE 376.9 MG/ML
11 INJECTION INTRAVENOUS
Status: COMPLETED | OUTPATIENT
Start: 2025-05-14 | End: 2025-05-14

## 2025-05-14 RX ADMIN — GADOTERATE MEGLUMINE 11 ML: 376.9 INJECTION INTRAVENOUS at 14:10

## 2025-05-14 NOTE — PROGRESS NOTES
Patient is a 64 y.o. female with history of MS and nonambulatory for years is s/p IMN L femur for left distal femur fracture.  Date of surgery was 4/15/2025.  Patient continues WBAT LLE and able to transfer from her W/C with use of chair lift which is her baseline transfer method.  She denies issues with incision. Patient continues on Eliquis for treatment of L femoral vein DVT . Patient denies fever or chills, N/T or calf pain. Her  is with her today.  She is happy with the progress she made and feels her pain is slowly subsiding.     General: Alert and oriented x 3, NAD, respirations easy and unlabored with no audible wheezes, skin warm and dry, speech and dress appropriate for noted age, affect euthymic.     Musculoskeletal: LLE  incisions c/d/i  mild swelling to lower leg  compartments soft  no calf tenderness  sensation intact to light touch  motor intact including TA/GS/EHL  palpable DP/PT pulses 2+     X-ray: I personally reviewed multiple views of the L femur and knee were obtained in the office today demonstrate maintenance of reduction, interval healing, and a stable position of the hardware.     IMP:  Problem List Items Addressed This Visit       Closed fracture of left distal femur - Primary    Relevant Orders    XR femur left 2+ views (Completed)    XR knee left 1-2 views (Completed)       PLAN:  Imaging reviewed with pt; healing well. Can continue WB and activity as tolerated. No ROM restrictions. Follow up 6 weeks with 2 views L femur and 2 views L knee

## 2025-05-15 ENCOUNTER — HOME CARE VISIT (OUTPATIENT)
Dept: HOME HEALTH SERVICES | Facility: HOME HEALTH | Age: 65
End: 2025-05-15
Payer: COMMERCIAL

## 2025-05-15 PROCEDURE — G0151 HHCP-SERV OF PT,EA 15 MIN: HCPCS

## 2025-05-15 PROCEDURE — G0299 HHS/HOSPICE OF RN EA 15 MIN: HCPCS

## 2025-05-16 VITALS
SYSTOLIC BLOOD PRESSURE: 106 MMHG | RESPIRATION RATE: 12 BRPM | BODY MASS INDEX: 18.25 KG/M2 | TEMPERATURE: 97.9 F | DIASTOLIC BLOOD PRESSURE: 60 MMHG | HEART RATE: 85 BPM | OXYGEN SATURATION: 99 % | WEIGHT: 120 LBS

## 2025-05-16 SDOH — ECONOMIC STABILITY: GENERAL

## 2025-05-16 ASSESSMENT — ACTIVITIES OF DAILY LIVING (ADL)
AMBULATION ASSISTANCE: ONE PERSON
GROOMING_CURRENT_FUNCTION: MAXIMUM ASSIST
AMBULATION ASSISTANCE: 1
AMBULATION ASSISTANCE: MAXIMUM ASSIST
MONEY MANAGEMENT (EXPENSES/BILLS): NEEDS ASSISTANCE
GROOMING ASSESSED: 1
OASIS_M1830: 03
HOME_HEALTH_OASIS: 01
CURRENT_FUNCTION: MAXIMUM ASSIST
PHYSICAL TRANSFERS ASSESSED: 1
PHYSICAL_TRANSFERS_DEVICES: HOYER LIFT

## 2025-05-16 ASSESSMENT — ENCOUNTER SYMPTOMS
DENIES PAIN: 1
PERSON REPORTING PAIN: PATIENT

## 2025-05-17 ASSESSMENT — ENCOUNTER SYMPTOMS
PERSON REPORTING PAIN: PATIENT
DENIES PAIN: 1

## 2025-05-23 ENCOUNTER — OFFICE VISIT (OUTPATIENT)
Dept: WOUND CARE | Facility: CLINIC | Age: 65
End: 2025-05-23
Payer: COMMERCIAL

## 2025-05-23 PROCEDURE — 11042 DBRDMT SUBQ TIS 1ST 20SQCM/<: CPT

## 2025-06-04 DIAGNOSIS — N20.0 KIDNEY STONES: ICD-10-CM

## 2025-06-04 DIAGNOSIS — N31.9 NEUROGENIC BLADDER: ICD-10-CM

## 2025-06-04 DIAGNOSIS — N30.90 RECURRENT CYSTITIS: ICD-10-CM

## 2025-06-05 ENCOUNTER — HOSPITAL ENCOUNTER (OUTPATIENT)
Dept: RADIOLOGY | Facility: CLINIC | Age: 65
Discharge: HOME | End: 2025-06-05
Payer: COMMERCIAL

## 2025-06-05 ENCOUNTER — PATIENT OUTREACH (OUTPATIENT)
Dept: PRIMARY CARE | Facility: CLINIC | Age: 65
End: 2025-06-05
Payer: COMMERCIAL

## 2025-06-05 DIAGNOSIS — Z12.31 ENCOUNTER FOR SCREENING MAMMOGRAM FOR MALIGNANT NEOPLASM OF BREAST: ICD-10-CM

## 2025-06-05 DIAGNOSIS — Z78.0 MENOPAUSE: ICD-10-CM

## 2025-06-05 PROCEDURE — 77080 DXA BONE DENSITY AXIAL: CPT

## 2025-06-05 PROCEDURE — 77067 SCR MAMMO BI INCL CAD: CPT | Performed by: RADIOLOGY

## 2025-06-05 PROCEDURE — 77067 SCR MAMMO BI INCL CAD: CPT

## 2025-06-05 PROCEDURE — 77063 BREAST TOMOSYNTHESIS BI: CPT | Performed by: RADIOLOGY

## 2025-06-05 RX ORDER — NITROFURANTOIN 25; 75 MG/1; MG/1
100 CAPSULE ORAL DAILY
Qty: 90 CAPSULE | Refills: 3 | Status: SHIPPED | OUTPATIENT
Start: 2025-06-05

## 2025-06-06 ENCOUNTER — OFFICE VISIT (OUTPATIENT)
Dept: WOUND CARE | Facility: CLINIC | Age: 65
End: 2025-06-06
Payer: COMMERCIAL

## 2025-06-06 PROCEDURE — 11042 DBRDMT SUBQ TIS 1ST 20SQCM/<: CPT

## 2025-06-10 DIAGNOSIS — M81.0 AGE RELATED OSTEOPOROSIS, UNSPECIFIED PATHOLOGICAL FRACTURE PRESENCE: Primary | ICD-10-CM

## 2025-06-20 ENCOUNTER — OFFICE VISIT (OUTPATIENT)
Dept: WOUND CARE | Facility: CLINIC | Age: 65
End: 2025-06-20
Payer: COMMERCIAL

## 2025-06-30 ENCOUNTER — HOSPITAL ENCOUNTER (OUTPATIENT)
Dept: RADIOLOGY | Facility: CLINIC | Age: 65
Discharge: HOME | End: 2025-06-30
Payer: COMMERCIAL

## 2025-06-30 DIAGNOSIS — N31.9 NEUROGENIC BLADDER: ICD-10-CM

## 2025-06-30 DIAGNOSIS — N20.0 KIDNEY STONES: ICD-10-CM

## 2025-06-30 DIAGNOSIS — N30.90 RECURRENT CYSTITIS: ICD-10-CM

## 2025-06-30 PROCEDURE — 76770 US EXAM ABDO BACK WALL COMP: CPT

## 2025-06-30 PROCEDURE — 76770 US EXAM ABDO BACK WALL COMP: CPT | Performed by: RADIOLOGY

## 2025-07-03 ENCOUNTER — OFFICE VISIT (OUTPATIENT)
Dept: WOUND CARE | Facility: CLINIC | Age: 65
End: 2025-07-03
Payer: COMMERCIAL

## 2025-07-03 PROCEDURE — 11042 DBRDMT SUBQ TIS 1ST 20SQCM/<: CPT

## 2025-07-16 ENCOUNTER — HOSPITAL ENCOUNTER (OUTPATIENT)
Dept: RADIOLOGY | Facility: CLINIC | Age: 65
Discharge: HOME | End: 2025-07-16
Payer: COMMERCIAL

## 2025-07-16 ENCOUNTER — OFFICE VISIT (OUTPATIENT)
Dept: ORTHOPEDIC SURGERY | Facility: CLINIC | Age: 65
End: 2025-07-16
Payer: COMMERCIAL

## 2025-07-16 DIAGNOSIS — S72.492A OTHER CLOSED FRACTURE OF DISTAL END OF LEFT FEMUR, INITIAL ENCOUNTER: ICD-10-CM

## 2025-07-16 PROCEDURE — 73560 X-RAY EXAM OF KNEE 1 OR 2: CPT | Mod: LEFT SIDE | Performed by: RADIOLOGY

## 2025-07-16 PROCEDURE — 73560 X-RAY EXAM OF KNEE 1 OR 2: CPT | Mod: LT

## 2025-07-16 PROCEDURE — 73552 X-RAY EXAM OF FEMUR 2/>: CPT | Mod: LEFT SIDE | Performed by: RADIOLOGY

## 2025-07-16 PROCEDURE — 99212 OFFICE O/P EST SF 10 MIN: CPT | Performed by: PHYSICIAN ASSISTANT

## 2025-07-16 PROCEDURE — 99024 POSTOP FOLLOW-UP VISIT: CPT | Performed by: PHYSICIAN ASSISTANT

## 2025-07-16 PROCEDURE — 73552 X-RAY EXAM OF FEMUR 2/>: CPT | Mod: LT

## 2025-07-17 ENCOUNTER — OFFICE VISIT (OUTPATIENT)
Dept: WOUND CARE | Facility: CLINIC | Age: 65
End: 2025-07-17
Payer: COMMERCIAL

## 2025-07-17 PROCEDURE — 11042 DBRDMT SUBQ TIS 1ST 20SQCM/<: CPT

## 2025-07-18 ENCOUNTER — APPOINTMENT (OUTPATIENT)
Dept: WOUND CARE | Facility: CLINIC | Age: 65
End: 2025-07-18
Payer: COMMERCIAL

## 2025-07-29 NOTE — PROGRESS NOTES
Patient is a 64 y.o. female with history of MS and nonambulatory for years is s/p IMN L femur for left distal femur fracture.  Date of surgery was 4/15/2025.  Patient continues WBAT LLE and able to transfer from her W/C with use of chair lift which is her baseline transfer method.  Feels that her mobility continues to become easier and really has no complaints of pain.     General: Alert and oriented x 3, NAD, respirations easy and unlabored with no audible wheezes, skin warm and dry, speech and dress appropriate for noted age, affect euthymic.     Musculoskeletal: LLE  incisions c/d/i  mild swelling to lower leg  compartments soft  no calf tenderness  sensation intact to light touch  motor intact including TA/GS/EHL  palpable DP/PT pulses 2+     X-ray: I personally reviewed multiple views of the L femur and knee were obtained in the office today demonstrate maintenance of reduction, interval healing, and a stable position of the hardware.     IMP:  Problem List Items Addressed This Visit       Closed fracture of left distal femur    Relevant Orders    XR knee left 1-2 views (Completed)    XR femur left 2+ views (Completed)       PLAN:  Imaging reviewed with pt; healing well. Can continue WB and activity as tolerated. No ROM restrictions. Follow up 3 months with 2 views L femur and 2 views L knee

## 2025-07-31 ENCOUNTER — OFFICE VISIT (OUTPATIENT)
Dept: WOUND CARE | Facility: CLINIC | Age: 65
End: 2025-07-31
Payer: COMMERCIAL

## 2025-07-31 PROCEDURE — 11042 DBRDMT SUBQ TIS 1ST 20SQCM/<: CPT

## 2025-08-14 ENCOUNTER — OFFICE VISIT (OUTPATIENT)
Dept: WOUND CARE | Facility: CLINIC | Age: 65
End: 2025-08-14
Payer: COMMERCIAL

## 2025-08-14 PROCEDURE — 11042 DBRDMT SUBQ TIS 1ST 20SQCM/<: CPT

## 2025-08-16 ASSESSMENT — RHEUMATOLOGY NEW PATIENT QUESTIONNAIRE
CHEST PAIN: N
MUSCLE WEAKNESS: Y
JOINT PAIN: N
MEMORY LOSS: N
ANXIETY: N
HEADACHES: N
SHORTNESS OF BREATH: N
DIFFICULTY SWALLOWING: N
EXCESSIVE HAIR LOSS (MORE THAN YOUR NORM): N
UNUSUAL BLEEDING: N
INCREASED SUSCEPTIBILITY TO INFECTION: N
SORES IN MOUTH OR NOSE: N
NIGHT SWEATS: N
DOUBLE OR BLURRED VISION: N
EYE DRYNESS: N
SKIN TIGHTNESS: N
RASH: N
DRYNESS OF MOUTH: N
LOSS OF CONSCIOUSNESS: N
COLOR CHANGES OF HANDS OR FEET IN THE COLD: N
EASY BRUISING: N
VAGINAL DRYNESS: N
ABNORMAL URINE: N
UNEXPLAINED HEARING LOSS: N
EYE PAIN: Y
DEPRESSION: N
VOMITING OF BLOOD OR COFFEE GROUND CONSISTENCY MATERIAL: N
DIFFICULTY FALLING ASLEEP: Y
JAUNDICE: N
SEIZURES: N
NODULES/BUMPS: N
FEVER: N
PAIN OR BURNING ON URINATION: N
HEARTBURN OR REFLUX: N
COUGH: N
SWOLLEN LEGS OR FEET: Y
SWOLLEN OR TENDER GLANDS: N
EYE REDNESS: Y
NUMBNESS OR TINGLING IN HANDS OR FEET: N
MORNING STIFFNESS: N
NAUSEA: N
UNEXPLAINED WEIGHT CHANGE: N
UNUSUALLY RAPID OR SLOWED HEART RATE: N
JOINT SWELLING: N
EASILY LOSING TEMPER: N
SUN SENSITIVE (SUN ALLERGY): N
SKIN REDNESS: N
ANEMIA: N
STOMACH PAIN: N
UNUSUAL FATIGUE: N
LOSS OF VISION: N
BLOOD IN STOOLS: N
AGITATION: N
DIFFICULTY STAYING ASLEEP: N
RASH OR ULCERS: N
PERSISTENT DIARRHEA: N
BEHAVIORAL CHANGES: N
FAINTING: N
HOARSE VOICE: N

## 2025-08-21 ENCOUNTER — APPOINTMENT (OUTPATIENT)
Dept: RHEUMATOLOGY | Facility: CLINIC | Age: 65
End: 2025-08-21
Payer: COMMERCIAL

## 2025-08-21 VITALS
BODY MASS INDEX: 18.25 KG/M2 | SYSTOLIC BLOOD PRESSURE: 133 MMHG | WEIGHT: 120 LBS | HEART RATE: 83 BPM | OXYGEN SATURATION: 98 % | DIASTOLIC BLOOD PRESSURE: 85 MMHG

## 2025-08-21 DIAGNOSIS — M80.00XA AGE-RELATED OSTEOPOROSIS WITH CURRENT PATHOLOGICAL FRACTURE, INITIAL ENCOUNTER: Primary | ICD-10-CM

## 2025-08-21 PROCEDURE — 84165 PROTEIN E-PHORESIS SERUM: CPT

## 2025-08-21 PROCEDURE — 99204 OFFICE O/P NEW MOD 45 MIN: CPT | Performed by: INTERNAL MEDICINE

## 2025-08-21 PROCEDURE — 86334 IMMUNOFIX E-PHORESIS SERUM: CPT

## 2025-08-21 PROCEDURE — 84155 ASSAY OF PROTEIN SERUM: CPT

## 2025-08-21 ASSESSMENT — PAIN SCALES - GENERAL: PAINLEVEL_OUTOF10: 0-NO PAIN

## 2025-08-21 ASSESSMENT — ENCOUNTER SYMPTOMS
LOSS OF SENSATION IN FEET: 0
DEPRESSION: 0
OCCASIONAL FEELINGS OF UNSTEADINESS: 0

## 2025-08-22 ENCOUNTER — SPECIALTY PHARMACY (OUTPATIENT)
Dept: PHARMACY | Facility: CLINIC | Age: 65
End: 2025-08-22

## 2025-08-22 ENCOUNTER — APPOINTMENT (OUTPATIENT)
Dept: LAB | Facility: HOSPITAL | Age: 65
End: 2025-08-22
Payer: COMMERCIAL

## 2025-08-22 DIAGNOSIS — M80.00XA OSTEOPOROSIS WITH PATHOLOGICAL FRACTURE, INITIAL ENCOUNTER: Primary | ICD-10-CM

## 2025-08-22 LAB
25(OH)D3+25(OH)D2 SERPL-MCNC: 61 NG/ML (ref 30–100)
ALBUMIN SERPL-MCNC: 4.4 G/DL (ref 3.6–5.1)
ALP SERPL-CCNC: 139 U/L (ref 37–153)
ALT SERPL-CCNC: 17 U/L (ref 6–29)
ANION GAP SERPL CALCULATED.4IONS-SCNC: 8 MMOL/L (CALC) (ref 7–17)
AST SERPL-CCNC: 19 U/L (ref 10–35)
BILIRUB SERPL-MCNC: 0.7 MG/DL (ref 0.2–1.2)
BUN SERPL-MCNC: 27 MG/DL (ref 7–25)
CALCIUM SERPL-MCNC: 10 MG/DL (ref 8.6–10.4)
CHLORIDE SERPL-SCNC: 105 MMOL/L (ref 98–110)
CO2 SERPL-SCNC: 30 MMOL/L (ref 20–32)
CREAT SERPL-MCNC: 0.73 MG/DL (ref 0.5–1.05)
EGFRCR SERPLBLD CKD-EPI 2021: 92 ML/MIN/1.73M2
GLUCOSE SERPL-MCNC: 81 MG/DL (ref 65–139)
POTASSIUM SERPL-SCNC: 4.7 MMOL/L (ref 3.5–5.3)
PROT SERPL-MCNC: 7.2 G/DL (ref 6.4–8.2)
PROT SERPL-MCNC: 7.3 G/DL (ref 6.1–8.1)
PTH-INTACT SERPL-MCNC: 51 PG/ML (ref 16–77)
SODIUM SERPL-SCNC: 143 MMOL/L (ref 135–146)

## 2025-08-22 PROCEDURE — RXMED WILLOW AMBULATORY MEDICATION CHARGE

## 2025-08-22 RX ORDER — TERIPARATIDE 250 UG/ML
20 INJECTION, SOLUTION SUBCUTANEOUS DAILY
Qty: 1 EACH | Refills: 2 | Status: SHIPPED | OUTPATIENT
Start: 2025-08-22

## 2025-08-25 ENCOUNTER — PHARMACY VISIT (OUTPATIENT)
Dept: PHARMACY | Facility: CLINIC | Age: 65
End: 2025-08-25
Payer: COMMERCIAL

## 2025-08-26 ENCOUNTER — TELEMEDICINE CLINICAL SUPPORT (OUTPATIENT)
Dept: PHARMACY | Facility: HOSPITAL | Age: 65
End: 2025-08-26
Payer: COMMERCIAL

## 2025-08-26 DIAGNOSIS — M80.00XA OSTEOPOROSIS WITH PATHOLOGICAL FRACTURE, INITIAL ENCOUNTER: ICD-10-CM

## 2025-08-26 LAB
ALBUMIN: 4 G/DL (ref 3.4–5)
ALPHA 1 GLOBULIN: 0.3 G/DL (ref 0.2–0.6)
ALPHA 2 GLOBULIN: 0.8 G/DL (ref 0.4–1.1)
BETA GLOBULIN: 1 G/DL (ref 0.5–1.2)
GAMMA GLOBULIN: 1 G/DL (ref 0.5–1.4)
IMMUNOFIXATION COMMENT: NORMAL
PATH REVIEW - SERUM IMMUNOFIXATION: NORMAL
PATH REVIEW-SERUM PROTEIN ELECTROPHORESIS: NORMAL
PROTEIN ELECTROPHORESIS COMMENT: NORMAL

## 2025-08-26 RX ORDER — TERIPARATIDE 250 UG/ML
20 INJECTION, SOLUTION SUBCUTANEOUS DAILY
Qty: 1 EACH | Refills: 1 | Status: SHIPPED | OUTPATIENT
Start: 2025-08-26

## 2025-08-28 ENCOUNTER — APPOINTMENT (OUTPATIENT)
Dept: WOUND CARE | Facility: CLINIC | Age: 65
End: 2025-08-28
Payer: COMMERCIAL

## 2025-08-29 ENCOUNTER — APPOINTMENT (OUTPATIENT)
Dept: UROLOGY | Facility: CLINIC | Age: 65
End: 2025-08-29
Payer: COMMERCIAL

## 2025-09-02 ENCOUNTER — APPOINTMENT (OUTPATIENT)
Dept: UROLOGY | Facility: CLINIC | Age: 65
End: 2025-09-02
Payer: COMMERCIAL

## 2025-09-04 ENCOUNTER — OFFICE VISIT (OUTPATIENT)
Dept: WOUND CARE | Facility: CLINIC | Age: 65
End: 2025-09-04
Payer: COMMERCIAL

## 2025-09-04 PROCEDURE — 11042 DBRDMT SUBQ TIS 1ST 20SQCM/<: CPT

## 2025-09-11 ENCOUNTER — APPOINTMENT (OUTPATIENT)
Dept: WOUND CARE | Facility: CLINIC | Age: 65
End: 2025-09-11
Payer: COMMERCIAL

## 2025-10-09 ENCOUNTER — APPOINTMENT (OUTPATIENT)
Dept: NEUROLOGY | Facility: CLINIC | Age: 65
End: 2025-10-09
Payer: COMMERCIAL

## 2026-04-07 ENCOUNTER — APPOINTMENT (OUTPATIENT)
Dept: PRIMARY CARE | Facility: CLINIC | Age: 66
End: 2026-04-07
Payer: COMMERCIAL

## 2026-09-02 ENCOUNTER — APPOINTMENT (OUTPATIENT)
Dept: UROLOGY | Facility: CLINIC | Age: 66
End: 2026-09-02
Payer: COMMERCIAL

## (undated) DEVICE — STAPLER, SKIN PROXIMATE, 35 WIDE

## (undated) DEVICE — SUTURE, VICRYL, 1, 27 IN, CT-1, VIOLET

## (undated) DEVICE — DRAPE, SHEET, U, W/ADHESIVE STRIP, IMPERVIOUS, 60 X 70 IN, DISPOSABLE, LF, STERILE

## (undated) DEVICE — DRAPE, SHEET, THREE QUARTER, FAN FOLD, 57 X 77 IN

## (undated) DEVICE — ELECTRODE, ELECTROSURGICAL, BLADE, STANDARD, 2.75 IN

## (undated) DEVICE — APPLICATOR, CHLORAPREP, W/ORANGE TINT, 26ML

## (undated) DEVICE — Device

## (undated) DEVICE — BANDAGE, GAUZE, CONFORMING, KERLIX, 6 PLY, 4.5 IN X 4.1 YD

## (undated) DEVICE — DRAPE, INCISE, ANTIMICROBIAL, IOBAN 2, LARGE, 17 X 23 IN, DISPOSABLE, STERILE

## (undated) DEVICE — BANDAGE, ELASTIC, ACE, ACE, DOUBLE LENGTH, 6 X 550 IN, LF

## (undated) DEVICE — DRAPE COVER, C ARM, FLOUROSCAN IMAGING SYS

## (undated) DEVICE — COVER, CART, 45 X 27 X 48 IN, CLEAR

## (undated) DEVICE — COVER, BACK TABLE, 65 X 90, HVY REINFORCED

## (undated) DEVICE — APPLICATOR, PREP, CHLORAPREP, W/ORANGE TINT, 10.5ML

## (undated) DEVICE — DRESSING, MEPILEX BORDER, POST-OP AG, 4 X 6 IN

## (undated) DEVICE — SUTURE, VICRYL PLUS, 2-0, 27IN, PSL, UND, BRAIDED

## (undated) DEVICE — BANDAGE, COFLEX, 6 X 5 YDS, FOAM TAN, STERILE, LF

## (undated) DEVICE — COVER, C-ARM W/CLIPS, OEC GE